# Patient Record
Sex: MALE | Race: WHITE | NOT HISPANIC OR LATINO | Employment: FULL TIME | URBAN - NONMETROPOLITAN AREA
[De-identification: names, ages, dates, MRNs, and addresses within clinical notes are randomized per-mention and may not be internally consistent; named-entity substitution may affect disease eponyms.]

---

## 2017-12-14 ENCOUNTER — TRANSCRIBE ORDERS (OUTPATIENT)
Dept: ADMINISTRATIVE | Facility: HOSPITAL | Age: 37
End: 2017-12-14

## 2017-12-14 ENCOUNTER — HOSPITAL ENCOUNTER (OUTPATIENT)
Dept: RESPIRATORY THERAPY | Facility: HOSPITAL | Age: 37
Discharge: HOME | End: 2017-12-14
Admitting: NURSE PRACTITIONER

## 2017-12-14 DIAGNOSIS — R00.2 PALPITATIONS: ICD-10-CM

## 2017-12-14 DIAGNOSIS — R00.2 PALPITATIONS: Primary | ICD-10-CM

## 2017-12-14 PROCEDURE — 93226 XTRNL ECG REC<48 HR SCAN A/R: CPT

## 2017-12-14 PROCEDURE — 93225 XTRNL ECG REC<48 HRS REC: CPT

## 2017-12-14 PROCEDURE — 93227 XTRNL ECG REC<48 HR R&I: CPT | Performed by: INTERNAL MEDICINE

## 2019-08-13 ENCOUNTER — OFFICE VISIT (OUTPATIENT)
Dept: UROLOGY | Facility: CLINIC | Age: 39
End: 2019-08-13

## 2019-08-13 VITALS — WEIGHT: 201.8 LBS | HEIGHT: 73 IN | BODY MASS INDEX: 26.74 KG/M2

## 2019-08-13 DIAGNOSIS — Z98.52 VASECTOMY STATUS: ICD-10-CM

## 2019-08-13 DIAGNOSIS — N41.1 PROSTATITIS, CHRONIC: Primary | ICD-10-CM

## 2019-08-13 PROCEDURE — 99204 OFFICE O/P NEW MOD 45 MIN: CPT | Performed by: UROLOGY

## 2019-08-13 RX ORDER — CELECOXIB 200 MG/1
200 CAPSULE ORAL
COMMUNITY
Start: 2015-01-28

## 2019-08-13 RX ORDER — DIAZEPAM 10 MG/1
TABLET ORAL
Qty: 2 TABLET | Refills: 0 | Status: SHIPPED | OUTPATIENT
Start: 2019-08-13 | End: 2021-02-19

## 2019-08-13 RX ORDER — SULFAMETHOXAZOLE AND TRIMETHOPRIM 800; 160 MG/1; MG/1
1 TABLET ORAL 2 TIMES DAILY
Qty: 84 TABLET | Refills: 0 | Status: SHIPPED | OUTPATIENT
Start: 2019-08-13 | End: 2021-02-19

## 2019-08-13 RX ORDER — CEPHALEXIN 500 MG/1
500 CAPSULE ORAL 2 TIMES DAILY
Qty: 8 CAPSULE | Refills: 0 | Status: SHIPPED | OUTPATIENT
Start: 2019-08-13 | End: 2019-08-17

## 2019-08-13 RX ORDER — TAMSULOSIN HYDROCHLORIDE 0.4 MG/1
1 CAPSULE ORAL NIGHTLY
Qty: 30 CAPSULE | Refills: 5 | Status: SHIPPED | OUTPATIENT
Start: 2019-08-13

## 2019-08-13 NOTE — PROGRESS NOTES
"Chief Complaint:          Chief Complaint   Patient presents with   • Prostatitis     New Patient        HPI:   39 y.o. male referred with a history of prostatitis.  In the past is mostly been characterized his pain.  Is been present greater than 5 years he has had 3 rounds of Septra treated by Dr. Dee.  He has chills.  Not much pain, good stream, hesitancy, it is a sensation of sitting on an egg.  His erection and ejaculations are okay he has no allergies no prior surgeries no medical problems and negative family history of prostate cancer PSA that was \"okay\".  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  He has 2 children with a 39-year-old wife.  He is interested in elective scrotal vasectomy as well.  Patient presents for consideration of an elective scrotal vasectomy.  I discussed the procedure at length.  I discussed the fact that it has a risk of anesthesia, bleeding, infection, testicular pain postoperatively, a failure in the range of 1 in 10,000.  The important necessity to have a follow-up in about 8 weeks after the original procedure to be sure that the semen specimen is free of spermatozoa thus ensuring sterility.  I discussed the various forms out there including a 1 incision, 2 incision technique as well as the after extensive discussion they wish to proceed      Past Medical History:      History reviewed. No pertinent past medical history.      Current Meds:     Current Outpatient Medications   Medication Sig Dispense Refill   • celecoxib (CELEBREX) 200 MG capsule Take 200 mg by mouth.       No current facility-administered medications for this visit.         Allergies:      No Known Allergies     Past Surgical History:     History reviewed. No pertinent surgical history.      Social History:     Social History     Socioeconomic History   • Marital status:      Spouse name: Not on file   • Number of children: Not on file   • Years of education: Not on file   • Highest education " level: Not on file       Family History:     History reviewed. No pertinent family history.    Review of Systems:     Review of Systems   Constitutional: Negative.    HENT: Negative.    Eyes: Negative.    Respiratory: Negative.    Cardiovascular: Negative.    Gastrointestinal: Negative.    Endocrine: Negative.    Genitourinary: Positive for difficulty urinating, frequency and testicular pain.   Musculoskeletal: Negative.    Allergic/Immunologic: Negative.    Neurological: Negative.    Hematological: Negative.    Psychiatric/Behavioral: Negative.        Physical Exam:     Physical Exam   Constitutional: He is oriented to person, place, and time. He appears well-developed and well-nourished.   HENT:   Head: Normocephalic and atraumatic.   Eyes: Conjunctivae and EOM are normal. Pupils are equal, round, and reactive to light.   Neck: Normal range of motion.   Cardiovascular: Normal rate, regular rhythm, normal heart sounds and intact distal pulses.   Pulmonary/Chest: Effort normal and breath sounds normal.   Abdominal: Soft. Bowel sounds are normal.   Genitourinary:   Genitourinary Comments: Soft nontender abdomen with no organomegaly, rigidity, or tenderness.  He has normal external genitalia and circumcised phallus n bilaterally descended testes without masses there is no inguinal hernias adenopathy or abnormalities he had good rectal tone and a large smooth firm prostate.  There is no nodularity or any suspicious rectal abnormalities     Musculoskeletal: Normal range of motion.   Neurological: He is alert and oriented to person, place, and time. He has normal reflexes.   Skin: Skin is warm and dry.   Psychiatric: He has a normal mood and affect. His behavior is normal. Judgment and thought content normal.   Nursing note and vitals reviewed.      I have reviewed the following portions of the patient's history: allergies, current medications, past family history, past medical history, past social history, past surgical  history, problem list and ROS and confirm it's accurate.      Procedure:       Assessment/Plan:   Prostatitis-we discussed the differential diagnosis of prostatitis including the National Institutes of Health classification system I through IV.  I discussed that type I prostatitis is  acute bacterial prostatitis and an associated with high fevers typically hematuria and severe pain with voiding.  We discussed the fact that it necessitates 6 weeks of chronic antibiotics to be sure it doesn't turn into a chronic bacterial prostatitis that can have lifelong ramifications.  We discussed National Institutes of Health type II chronic bacterial prostatitis.  We discussed the fact that this a chronic bacterial infection of the prostate that often requires suppressive antibiotics.  We discussed type III being related more to nonspecific urethritis as well as tight for being related to finding inflammation and a biopsy in the absence of symptomatology.  I discussed the diagnostic strategies including the VB 1 through 4 urine culture and discussed empiric therapy.  I emphasized that with the use of chronic antibiotics I strongly recommended the concomitant use of probiotics.  Additionally, we discussed the various antibiotics used and the risks and benefits associated with each particularly the use of long-term ciprofloxacin and the effect on joints and collagen in human beings.  I meant to use intermittent treatment with Septra.  We discussed the pathophysiology of acute bacterial prostatitis in the segue into chronic bacterial prostatitis which is what I believe he has.  Vasectomy status-interested in elective scrotal vasectomy  Testalgia-likely pressure voiding, vasectomy should help      .      Patient's Body mass index is 26.62 kg/m². BMI is above normal parameters. Recommendations include: educational material.              This document has been electronically signed by GAYLE GARCIA MD August 13, 2019 8:33 AM

## 2019-08-14 PROBLEM — Z98.52 VASECTOMY STATUS: Status: ACTIVE | Noted: 2019-08-14

## 2019-08-14 PROBLEM — N41.1 PROSTATITIS, CHRONIC: Status: ACTIVE | Noted: 2019-08-14

## 2019-09-20 ENCOUNTER — PRIOR AUTHORIZATION (OUTPATIENT)
Dept: UROLOGY | Facility: CLINIC | Age: 39
End: 2019-09-20

## 2020-10-07 ENCOUNTER — TRANSCRIBE ORDERS (OUTPATIENT)
Dept: ADMINISTRATIVE | Facility: HOSPITAL | Age: 40
End: 2020-10-07

## 2020-10-07 DIAGNOSIS — M54.2 CERVICALGIA: Primary | ICD-10-CM

## 2020-10-20 ENCOUNTER — HOSPITAL ENCOUNTER (OUTPATIENT)
Dept: MRI IMAGING | Facility: HOSPITAL | Age: 40
Discharge: HOME | End: 2020-10-20
Admitting: NURSE PRACTITIONER

## 2020-10-20 DIAGNOSIS — M54.2 CERVICALGIA: ICD-10-CM

## 2020-10-20 PROCEDURE — 72141 MRI NECK SPINE W/O DYE: CPT

## 2020-10-20 PROCEDURE — 72141 MRI NECK SPINE W/O DYE: CPT | Performed by: RADIOLOGY

## 2020-12-08 ENCOUNTER — OFFICE VISIT (OUTPATIENT)
Dept: NEUROSURGERY | Facility: CLINIC | Age: 40
End: 2020-12-08

## 2020-12-08 VITALS
WEIGHT: 224.2 LBS | DIASTOLIC BLOOD PRESSURE: 90 MMHG | HEIGHT: 73 IN | BODY MASS INDEX: 29.72 KG/M2 | SYSTOLIC BLOOD PRESSURE: 142 MMHG

## 2020-12-08 DIAGNOSIS — G56.03 BILATERAL CARPAL TUNNEL SYNDROME: Primary | ICD-10-CM

## 2020-12-08 DIAGNOSIS — M51.36 DEGENERATIVE DISC DISEASE, LUMBAR: ICD-10-CM

## 2020-12-08 PROCEDURE — 99203 OFFICE O/P NEW LOW 30 MIN: CPT | Performed by: NEUROLOGICAL SURGERY

## 2020-12-08 RX ORDER — GABAPENTIN 800 MG/1
800 TABLET ORAL 3 TIMES DAILY
COMMUNITY

## 2020-12-08 RX ORDER — IBUPROFEN 800 MG/1
800 TABLET ORAL EVERY 6 HOURS PRN
COMMUNITY

## 2020-12-08 RX ORDER — ACETAMINOPHEN AND CODEINE PHOSPHATE 60; 300 MG/1; MG/1
1 TABLET ORAL EVERY 4 HOURS PRN
COMMUNITY

## 2020-12-08 RX ORDER — CYCLOBENZAPRINE HCL 10 MG
10 TABLET ORAL 3 TIMES DAILY PRN
COMMUNITY

## 2020-12-08 NOTE — PROGRESS NOTES
"Amor Avila  1980  [id number]                        CHIEF COMPLAINT: Cervical and upper extremity pain; lumbar pain.         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  He has been told that he has a \"idiopathic neuropathy\".  He has been to physical therapy with very little improvement.  He has been taking NSAIDs which helped marginally.  He has been on gabapentin which is somewhat helpful.  In addition to pain in the cervical area she has pain in his low back which radiates into his lower extremities a bit more so on the right than the left.  He has no bowel or bladder dysfunction although he has chronic prostatitis.  Cervical MRI has been performed is referred for neurosurgical consultation.           Past Medical History:   Diagnosis Date   • Prostatitis, chronic 8/14/2019            History reviewed. No pertinent surgical history.           Family History   Problem Relation Age of Onset   • No Known Problems Father    • No Known Problems Mother               Social History     Socioeconomic History   • Marital status:      Spouse name: Not on file   • Number of children: Not on file   • Years of education: Not on file   • Highest education level: Not on file   Tobacco Use   • Smoking status: Never Smoker   • Smokeless tobacco: Current User   Substance and Sexual Activity   • Alcohol use: Yes     Frequency: Never   • Drug use: No            No Known Allergies           Current Outpatient Medications:   •  acetaminophen-codeine (TYLENOL #4) 300-60 MG per tablet, Take 1 tablet by mouth Every 4 (Four) Hours As Needed for Moderate Pain ., Disp: , Rfl:   •  cyclobenzaprine (FLEXERIL) 10 MG tablet, Take 10 mg by mouth 3 (Three) Times a Day As Needed for Muscle Spasms., Disp: , Rfl:   •  " "gabapentin (NEURONTIN) 800 MG tablet, Take 800 mg by mouth 3 (Three) Times a Day., Disp: , Rfl:   •  ibuprofen (ADVIL,MOTRIN) 800 MG tablet, Take 800 mg by mouth Every 6 (Six) Hours As Needed for Mild Pain ., Disp: , Rfl:   •  celecoxib (CELEBREX) 200 MG capsule, Take 200 mg by mouth., Disp: , Rfl:   •  diazePAM (VALIUM) 10 MG tablet, Use one tablet night before procedure at bedtime and morning of prior to procedure, Disp: 2 tablet, Rfl: 0  •  sulfamethoxazole-trimethoprim (BACTRIM DS) 800-160 MG per tablet, Take 1 tablet by mouth 2 (Two) Times a Day., Disp: 84 tablet, Rfl: 0  •  tamsulosin (FLOMAX) 0.4 MG capsule 24 hr capsule, Take 1 capsule by mouth Every Night., Disp: 30 capsule, Rfl: 5         Review of Systems   Constitutional: Positive for fatigue.   HENT: Positive for sinus pressure.    Musculoskeletal: Positive for arthralgias, back pain, neck pain and neck stiffness.   Allergic/Immunologic: Positive for environmental allergies and food allergies.   Neurological: Positive for dizziness, weakness, light-headedness, numbness and headaches. Negative for seizures.   Psychiatric/Behavioral: Positive for decreased concentration and sleep disturbance.               Vitals:    12/08/20 0955   BP: 142/90   BP Location: Right arm   Patient Position: Sitting   Cuff Size: Adult   Weight: 102 kg (224 lb 3.2 oz)   Height: 185.4 cm (73\")               EXAMINATION: BMI 29.5; weight 224.  He has full and active range of motion of the cervical and lumbar region.  I am unable to find evidence of weakness sensory loss or reflex asymmetry.  No Babinski Justus or clonus.  Gait is normal.            MEDICAL DECISION MAKING: Cervical MRI shows mild straightening and degenerative disc but no evidence of high-grade spinal or lateral recess stenosis.  No evidence of disc herniation or abnormal signal within the spinal cord.           ASSESSMENT/DISPOSITION: Although he has mild degenerative changes I do not find evidence of a large " disc herniation that would provide anatomical/clinical correlation.  I have ordered a lumbar MRI and the EMG and NCV of both upper extremities and will see him subsequently.  I shall keep you informed of his results of the studies and do appreciate seeing him.              I APPRECIATE THE OPPORTUNITY OF THIS REFERRAL. PLEASE CALL IF ANY       QUESTIONS [phone]

## 2020-12-15 ENCOUNTER — APPOINTMENT (OUTPATIENT)
Dept: MRI IMAGING | Facility: HOSPITAL | Age: 40
End: 2020-12-15

## 2020-12-16 ENCOUNTER — HOSPITAL ENCOUNTER (OUTPATIENT)
Dept: MRI IMAGING | Facility: HOSPITAL | Age: 40
Discharge: HOME | End: 2020-12-16
Admitting: NEUROLOGICAL SURGERY

## 2020-12-16 DIAGNOSIS — M51.36 DEGENERATIVE DISC DISEASE, LUMBAR: ICD-10-CM

## 2020-12-16 DIAGNOSIS — G56.03 BILATERAL CARPAL TUNNEL SYNDROME: ICD-10-CM

## 2020-12-16 PROCEDURE — 72148 MRI LUMBAR SPINE W/O DYE: CPT | Performed by: RADIOLOGY

## 2020-12-16 PROCEDURE — 72148 MRI LUMBAR SPINE W/O DYE: CPT

## 2021-02-12 ENCOUNTER — TELEPHONE (OUTPATIENT)
Dept: ORTHOPEDIC SURGERY | Facility: CLINIC | Age: 41
End: 2021-02-12

## 2021-02-19 ENCOUNTER — OFFICE VISIT (OUTPATIENT)
Dept: ORTHOPEDIC SURGERY | Facility: CLINIC | Age: 41
End: 2021-02-19

## 2021-02-19 VITALS
OXYGEN SATURATION: 98 % | WEIGHT: 210 LBS | DIASTOLIC BLOOD PRESSURE: 89 MMHG | SYSTOLIC BLOOD PRESSURE: 134 MMHG | RESPIRATION RATE: 17 BRPM | HEART RATE: 82 BPM | BODY MASS INDEX: 27.83 KG/M2 | HEIGHT: 73 IN | TEMPERATURE: 98 F

## 2021-02-19 DIAGNOSIS — M54.50 CHRONIC BILATERAL LOW BACK PAIN, UNSPECIFIED WHETHER SCIATICA PRESENT: Chronic | ICD-10-CM

## 2021-02-19 DIAGNOSIS — G89.29 CHRONIC NECK PAIN: Primary | Chronic | ICD-10-CM

## 2021-02-19 DIAGNOSIS — G89.29 CHRONIC BILATERAL LOW BACK PAIN, UNSPECIFIED WHETHER SCIATICA PRESENT: Chronic | ICD-10-CM

## 2021-02-19 DIAGNOSIS — M48.02 FORAMINAL STENOSIS OF CERVICAL REGION: Chronic | ICD-10-CM

## 2021-02-19 DIAGNOSIS — M50.30 DDD (DEGENERATIVE DISC DISEASE), CERVICAL: Chronic | ICD-10-CM

## 2021-02-19 DIAGNOSIS — M99.01 SEGMENTAL AND SOMATIC DYSFUNCTION OF CERVICAL REGION: ICD-10-CM

## 2021-02-19 DIAGNOSIS — M79.10 MUSCLE PAIN: ICD-10-CM

## 2021-02-19 DIAGNOSIS — M47.22 OSTEOARTHRITIS OF SPINE WITH RADICULOPATHY, CERVICAL REGION: Chronic | ICD-10-CM

## 2021-02-19 DIAGNOSIS — M62.838 MUSCLE SPASM: ICD-10-CM

## 2021-02-19 DIAGNOSIS — M47.896 OTHER OSTEOARTHRITIS OF SPINE, LUMBAR REGION: Chronic | ICD-10-CM

## 2021-02-19 DIAGNOSIS — M51.36 LUMBAR DEGENERATIVE DISC DISEASE: Chronic | ICD-10-CM

## 2021-02-19 DIAGNOSIS — M54.2 CHRONIC NECK PAIN: Primary | Chronic | ICD-10-CM

## 2021-02-19 PROBLEM — G56.01 CARPAL TUNNEL SYNDROME OF RIGHT WRIST: Chronic | Status: ACTIVE | Noted: 2021-02-19

## 2021-02-19 PROBLEM — M47.816 OSTEOARTHRITIS OF LUMBAR SPINE: Chronic | Status: ACTIVE | Noted: 2021-02-19

## 2021-02-19 PROBLEM — G56.22 CUBITAL TUNNEL SYNDROME ON LEFT: Chronic | Status: ACTIVE | Noted: 2021-02-19

## 2021-02-19 PROBLEM — M51.369 LUMBAR DEGENERATIVE DISC DISEASE: Chronic | Status: ACTIVE | Noted: 2021-02-19

## 2021-02-19 PROCEDURE — 98929 OSTEOPATH MANJ 9-10 REGIONS: CPT | Performed by: FAMILY MEDICINE

## 2021-02-19 PROCEDURE — 99203 OFFICE O/P NEW LOW 30 MIN: CPT | Performed by: FAMILY MEDICINE

## 2021-02-19 RX ORDER — FOLIC ACID 30; 200; 35; 20; 45; 55; 20; 3.4; 20; 120; 1000; 8; 200; 1500; 3; 25; 2.3 MG/1; MG/1; UG/1; MG/1; UG/1; UG/1; UG/1; MG/1; MG/1; MG/1; UG/1; UG/1; MG/1; UG/1; MG/1; MG/1; MG/1
TABLET ORAL
COMMUNITY
Start: 2021-02-16 | End: 2023-02-02 | Stop reason: ALTCHOICE

## 2021-02-19 NOTE — PROGRESS NOTES
Osteopathic Manipulative Treatment - OMT - Procedure Note      Patient: Amor Avila  YOB: 1980  Date of Encounter: 02/19/2021  Александр Jordan, DO     Subjective   Amor Avila is a 40 y.o. male who presents to the office today for Osteopathic Manipulative Treatment for Neck Pain (Neck and Back Pain)      HPI:   HPI patient was new to the practice but who had previously seen me at my old office, presents complaining of musculoskeletal neck and shoulder and low back pain.  He was previously getting OMT treatments regularly and would like to restart these.  Patient has had more swelling issues in the last year.  Has had MRIs of his neck and low back which showed bulging disks.  He sometimes has radiating pain into the legs and arms.  Has been recently diagnosed with right sided carpal tunnel syndrome and left-sided cubital tunnel syndrome.  Was getting OMT every 3 months from Dr. Rose in [place].  Is also being treated for suspected seronegative RA by Dr. Lubin.  Low back is not bothering him much today but the neck pain is. C/o lower neck pain radiating down into the shoulders.    Active Problem List:  Patient Active Problem List   Diagnosis   • Vasectomy status   • Prostatitis, chronic   • Lumbar degenerative disc disease   • DDD (degenerative disc disease), cervical   • Foraminal stenosis of cervical region   • Osteoarthritis of spine with radiculopathy, cervical region   • Osteoarthritis of lumbar spine   • Carpal tunnel syndrome of right wrist   • Cubital tunnel syndrome on left       Past Medical History:  Past Medical History:   Diagnosis Date   • Hypertension    • Prostatitis, chronic 8/14/2019   • Rheumatoid arthritis (CMS/HCC)        Past Surgical History:  Past Surgical History:   Procedure Laterality Date   • NO PAST SURGERIES         Family History:  Family History   Problem Relation Age of Onset   • Osteoarthritis Father    • Cancer Mother        Social History:  Social History  "    Socioeconomic History   • Marital status:      Spouse name: Not on file   • Number of children: Not on file   • Years of education: Not on file   • Highest education level: Not on file   Tobacco Use   • Smoking status: Former Smoker   • Smokeless tobacco: Current User   Substance and Sexual Activity   • Alcohol use: Yes     Frequency: Never     Comment: OCC   • Drug use: No   • Sexual activity: Defer       Allergies:  No Known Allergies    Review of Systems:   Review of Systems   Constitutional: Negative for activity change.   Respiratory: Negative for shortness of breath.    Musculoskeletal: Positive for arthralgias, myalgias and neck pain.   Neurological: Negative for weakness and numbness.       Physical Exam:   Visit Vitals  /89   Pulse 82   Temp 98 °F (36.7 °C)   Resp 17   Ht 185.4 cm (73\")   Wt 95.3 kg (210 lb)   SpO2 98%   BMI 27.71 kg/m²       Physical Exam  Vitals signs and nursing note reviewed.   Constitutional:       Appearance: Normal appearance.   Cardiovascular:      Rate and Rhythm: Normal rate.   Pulmonary:      Effort: Pulmonary effort is normal. No respiratory distress.   Skin:     General: Skin is warm and dry.   Neurological:      General: No focal deficit present.      Mental Status: He is alert and oriented to person, place, and time.      Sensory: No sensory deficit.      Motor: No weakness.      Deep Tendon Reflexes: Reflexes normal.       GENERAL: 40 y.o. male, alert and oriented X 3 in no acute distress.     Radiology Results:    Reviewed MRIs.  MRI C-spine 10/20/2020-  1.  C5-C6 disc desiccation and broad-based posterior disc bulging  causing mild left neural foraminal and lateral recess stenosis.  2.  C6-C7 broad-based posterior disc bulging causing left neural  foraminal and left lateral recess stenosis.    MRI L-spine -12/16/2020  Degenerative disc changes in the lower 2 lumbar disc spaces.  There is osteoarthritis in the facet joints that does mildly narrow " the  lateral recesses at L4-L5.    OMT Procedure  Indications: TART findings, muscle spasm, pain    Risks and benefits discussed and patient gave verbal consent to treat    Regions treated: Head, C-Spine, Ribs, Upper Extremity, T- Spine, L-Spine, Pelvis, Sacrum and Lower Extremity    Findings: Head Suboccipital restriction, Cervical Spine Generalized cervical motion restriction, AARL, C3 ERS R, C4 ERS R or C5 ERS L, Upper Extremity Right shoulder Restriction, Ribs 1-2 Right exhaled, Thoracic Spine Generalized Thoracic Restriction or T5 ERS R, Lumbar Spine Generalized Lumbar Restriction or L2 ERS L, Pelvis Right Posterior Innominate Rotation, Sacrum Right Sacral Restriction or Lower Extremity Right Psoas Restriction, Right Piriformis Restriction, Left Psoas Restriction or Left Piriformis Restriction    Modalities: HVLA, Muscle Energy and Direct Myofascial Release    Patient reports decreased pain, improved range of motion, and improved functionality after treatment. There were no adverse effects.    Assessment & Plan:   Assessment/Plan   Diagnoses and all orders for this visit:    1. Chronic neck pain (Primary)    2. Chronic bilateral low back pain, unspecified whether sciatica present    3. Muscle pain    4. Muscle spasm    5. Segmental and somatic dysfunction of cervical region    6. Lumbar degenerative disc disease    7. DDD (degenerative disc disease), cervical    8. Foraminal stenosis of cervical region    9. Osteoarthritis of spine with radiculopathy, cervical region    10. Other osteoarthritis of spine, lumbar region        MEDICATION ISSUES:  Discussed medication options and treatment plan of prescribed medication as well as the risks, benefits, and side effects including potential falls, possible impaired driving and metabolic adversities among others. Patient is agreeable to call the office with any worsening of symptoms or onset of side effects. Patient is agreeable to call 911 or go to the nearest ER  should he/she begin having SI/HI.     MEDS ORDERED DURING VISIT:  No orders of the defined types were placed in this encounter.    Patient did well with OMT today in the office.  Follow-up in 1 week for reevaluation and likely further treatment.  We will continue doing his physical therapy.  Continue to follow-up with his other specialist for his nerves and arthritis issues.           This document has been electronically signed by Jimenez Angel DO   February 19, 2021 12:01 EST    Part of this note may be an electronic transcription/translation of spoken language to printed text using the Dragon Dictation System.

## 2021-03-03 ENCOUNTER — OFFICE VISIT (OUTPATIENT)
Dept: ORTHOPEDIC SURGERY | Facility: CLINIC | Age: 41
End: 2021-03-03

## 2021-03-03 VITALS
HEIGHT: 73 IN | RESPIRATION RATE: 17 BRPM | TEMPERATURE: 97.7 F | WEIGHT: 210 LBS | BODY MASS INDEX: 27.83 KG/M2 | DIASTOLIC BLOOD PRESSURE: 79 MMHG | HEART RATE: 81 BPM | OXYGEN SATURATION: 98 % | SYSTOLIC BLOOD PRESSURE: 129 MMHG

## 2021-03-03 DIAGNOSIS — M62.838 MUSCLE SPASM: ICD-10-CM

## 2021-03-03 DIAGNOSIS — G89.29 CHRONIC NECK PAIN: Primary | ICD-10-CM

## 2021-03-03 DIAGNOSIS — M79.10 MUSCLE PAIN: ICD-10-CM

## 2021-03-03 DIAGNOSIS — M54.2 CHRONIC NECK PAIN: Primary | ICD-10-CM

## 2021-03-03 DIAGNOSIS — M54.50 CHRONIC BILATERAL LOW BACK PAIN, UNSPECIFIED WHETHER SCIATICA PRESENT: ICD-10-CM

## 2021-03-03 DIAGNOSIS — M99.07 SEGMENTAL AND SOMATIC DYSFUNCTION OF UPPER EXTREMITY: ICD-10-CM

## 2021-03-03 DIAGNOSIS — G89.29 CHRONIC BILATERAL LOW BACK PAIN, UNSPECIFIED WHETHER SCIATICA PRESENT: ICD-10-CM

## 2021-03-03 PROCEDURE — 99213 OFFICE O/P EST LOW 20 MIN: CPT | Performed by: FAMILY MEDICINE

## 2021-03-03 PROCEDURE — 98929 OSTEOPATH MANJ 9-10 REGIONS: CPT | Performed by: FAMILY MEDICINE

## 2021-03-03 NOTE — PROGRESS NOTES
Osteopathic Manipulative Treatment - OMT - Procedure Note      Patient: Amor Avila  YOB: 1980  Date of Encounter: 03/03/2021  No ref. provider found     Subjective   Amor Avila is a 40 y.o. male who presents to the office today for Osteopathic Manipulative Treatment for Pain of the Neck and Pain of the Lower Back      HPI:   HPI  Patient here for follow-up for chronic neck and back pain.  Did very well with OMT treatment at the last visit.  Feeling a lot better today with only mild soreness and spasm, worst in the left trapezius.  Would like another OMT treatment today.  Active Problem List:  Patient Active Problem List   Diagnosis   • Vasectomy status   • Prostatitis, chronic   • Lumbar degenerative disc disease   • DDD (degenerative disc disease), cervical   • Foraminal stenosis of cervical region   • Osteoarthritis of spine with radiculopathy, cervical region   • Osteoarthritis of lumbar spine   • Carpal tunnel syndrome of right wrist   • Cubital tunnel syndrome on left       Past Medical History:  Past Medical History:   Diagnosis Date   • Hypertension    • Prostatitis, chronic 8/14/2019   • Rheumatoid arthritis (CMS/[facility])        Past Surgical History:  Past Surgical History:   Procedure Laterality Date   • NO PAST SURGERIES         Family History:  Family History   Problem Relation Age of Onset   • Osteoarthritis Father    • Cancer Mother        Social History:  Social History     Socioeconomic History   • Marital status:      Spouse name: Not on file   • Number of children: Not on file   • Years of education: Not on file   • Highest education level: Not on file   Tobacco Use   • Smoking status: Former Smoker   • Smokeless tobacco: Current User   Substance and Sexual Activity   • Alcohol use: Yes     Frequency: Never     Comment: OCC   • Drug use: No   • Sexual activity: Defer       Allergies:  No Known Allergies    Review of Systems:   Review of Systems   Constitutional: Negative for  "activity change.   Musculoskeletal: Positive for myalgias and neck stiffness. Negative for arthralgias, back pain and neck pain.   Neurological: Negative for weakness and numbness.       Physical Exam:   Visit Vitals  /79   Pulse 81   Temp 97.7 °F (36.5 °C)   Resp 17   Ht 185.4 cm (73\")   Wt 95.3 kg (210 lb)   SpO2 98%   BMI 27.71 kg/m²       Physical Exam  Constitutional:       Appearance: Normal appearance.   Neck:      Musculoskeletal: Decreased range of motion. Muscular tenderness present. No pain with movement or spinous process tenderness.   Musculoskeletal:         General: Tenderness (L trapezius) present.      Left shoulder: He exhibits spasm.      Lumbar back: He exhibits decreased range of motion. He exhibits no tenderness.   Neurological:      General: No focal deficit present.      Mental Status: He is alert.      Sensory: No sensory deficit.      Motor: No weakness.       GENERAL: 40 y.o. male, alert and oriented X 3 in no acute distress.     Radiology Results:    No radiology results for the last 30 days.    OMT Procedure  Indications: TART findings, muscle spasm, pain    Risks and benefits discussed and patient gave verbal consent to treat    Regions treated: Head, C-Spine, Ribs, Upper Extremity, T- Spine, L-Spine, Pelvis, Sacrum and Lower Extremity    Findings: Head Suboccipital restriction, Cervical Spine Generalized cervical motion restriction, C3 ERS R, C4 FRS R or C6 ERS L, Upper Extremity Left shoulder restriction, Ribs 1-2 Left inhaled, Thoracic Spine Generalized Thoracic Restriction, T4 ERS R, T5 ERS R or T6 ERS R, Lumbar Spine Generalized Lumbar Restriction, L2 ERS L or L4 ERS R, Pelvis Right Posterior Innominate Rotation, Sacrum Right Sacral Restriction or Lower Extremity Right Piriformis Restriction or Left Piriformis Restriction    Modalities: HVLA, Muscle Energy, Counterstrain, Direct Myofascial Release and Indirect Myofascial Release    Patient reports decreased pain, improved " range of motion, and improved functionality after treatment. There were no adverse effects.    Assessment & Plan:   Assessment/Plan   Diagnoses and all orders for this visit:    1. Chronic neck pain (Primary)    2. Segmental and somatic dysfunction of upper extremity    3. Chronic bilateral low back pain, unspecified whether sciatica present    4. Muscle pain    5. Muscle spasm    Patient felt notably better after treatment, especially in the right trapezius area.  Follow-up in 1 week for reevaluation and likely further OMT.  We will treat him weekly for a few more treatments and then consider spacing it out if he is continuing to do well.         This document has been electronically signed by Jimenez Angel DO   March 3, 2021 14:46 EST    Part of this note may be an electronic transcription/translation of spoken language to printed text using the Dragon Dictation System.

## 2021-03-10 ENCOUNTER — OFFICE VISIT (OUTPATIENT)
Dept: ORTHOPEDIC SURGERY | Facility: CLINIC | Age: 41
End: 2021-03-10

## 2021-03-10 VITALS
HEIGHT: 73 IN | TEMPERATURE: 98.3 F | SYSTOLIC BLOOD PRESSURE: 137 MMHG | RESPIRATION RATE: 17 BRPM | BODY MASS INDEX: 27.83 KG/M2 | HEART RATE: 80 BPM | OXYGEN SATURATION: 98 % | DIASTOLIC BLOOD PRESSURE: 85 MMHG | WEIGHT: 210 LBS

## 2021-03-10 DIAGNOSIS — M54.50 CHRONIC BILATERAL LOW BACK PAIN, UNSPECIFIED WHETHER SCIATICA PRESENT: ICD-10-CM

## 2021-03-10 DIAGNOSIS — M99.01 SEGMENTAL AND SOMATIC DYSFUNCTION OF CERVICAL REGION: ICD-10-CM

## 2021-03-10 DIAGNOSIS — M79.10 MUSCLE PAIN: ICD-10-CM

## 2021-03-10 DIAGNOSIS — M62.838 MUSCLE SPASM: ICD-10-CM

## 2021-03-10 DIAGNOSIS — M54.2 CHRONIC NECK PAIN: Primary | ICD-10-CM

## 2021-03-10 DIAGNOSIS — G89.29 CHRONIC BILATERAL LOW BACK PAIN, UNSPECIFIED WHETHER SCIATICA PRESENT: ICD-10-CM

## 2021-03-10 DIAGNOSIS — G89.29 CHRONIC NECK PAIN: Primary | ICD-10-CM

## 2021-03-10 PROCEDURE — 99213 OFFICE O/P EST LOW 20 MIN: CPT | Performed by: FAMILY MEDICINE

## 2021-03-10 PROCEDURE — 98929 OSTEOPATH MANJ 9-10 REGIONS: CPT | Performed by: FAMILY MEDICINE

## 2021-03-10 RX ORDER — PANTOPRAZOLE SODIUM 40 MG/1
TABLET, DELAYED RELEASE ORAL
COMMUNITY
Start: 2021-03-05

## 2021-03-10 NOTE — PROGRESS NOTES
Patient: Amor Avila  YOB: 1980  Date of Encounter: 03/10/2021  No ref. provider found     Subjective   Amor Avila is a 41 y.o. male who presents to the office today for chronic neck and low back pain.  HPI:   HPI  Patient presents for follow-up for chronic neck and low back pain.  OMT has been helpful in managing this.  He has been flared up more this week because he played basketball with his son and overdid it.  Would like to be treated again with OMT.  Active Problem List:  Patient Active Problem List   Diagnosis   • Vasectomy status   • Prostatitis, chronic   • Lumbar degenerative disc disease   • DDD (degenerative disc disease), cervical   • Foraminal stenosis of cervical region   • Osteoarthritis of spine with radiculopathy, cervical region   • Osteoarthritis of lumbar spine   • Carpal tunnel syndrome of right wrist   • Cubital tunnel syndrome on left       Past Medical History:  Past Medical History:   Diagnosis Date   • Hypertension    • Prostatitis, chronic 8/14/2019   • Rheumatoid arthritis (CMS/[facility])        Past Surgical History:  Past Surgical History:   Procedure Laterality Date   • NO PAST SURGERIES         Family History:  Family History   Problem Relation Age of Onset   • Osteoarthritis Father    • Cancer Mother        Social History:  Social History     Socioeconomic History   • Marital status:      Spouse name: Not on file   • Number of children: Not on file   • Years of education: Not on file   • Highest education level: Not on file   Tobacco Use   • Smoking status: Former Smoker   • Smokeless tobacco: Current User   Substance and Sexual Activity   • Alcohol use: Yes     Comment: OCC   • Drug use: No   • Sexual activity: Defer       Allergies:  No Known Allergies    Review of Systems:   Review of Systems   Constitutional: Positive for activity change.   Musculoskeletal: Positive for arthralgias, back pain, myalgias, neck pain and neck stiffness.   Neurological: Negative  "for weakness and numbness.       Physical Exam:   Visit Vitals  /85   Pulse 80   Temp 98.3 °F (36.8 °C)   Resp 17   Ht 185.4 cm (73\")   Wt 95.3 kg (210 lb)   SpO2 98%   BMI 27.71 kg/m²       Physical Exam  Constitutional:       Appearance: Normal appearance.   Musculoskeletal:         General: Tenderness (BRENDON trapezius, lumbar paraspinals) present.      Cervical back: Muscular tenderness present. No pain with movement or spinous process tenderness. Decreased range of motion.      Lumbar back: No tenderness. Decreased range of motion.   Neurological:      General: No focal deficit present.      Mental Status: He is alert.      Sensory: No sensory deficit.      Motor: No weakness.       OMT Procedure  Indications: TART findings, muscle spasm, pain    Risks and benefits discussed and patient gave verbal consent to treat    Regions treated: Head, C-Spine, Ribs, Upper Extremity, T- Spine, L-Spine, Pelvis, Sacrum and Lower Extremity    Findings: Head Suboccipital restriction, Cervical Spine Generalized cervical motion restriction, AARL, C4 ERS L, C5 ERS L or C6 ERS L, Upper Extremity Left shoulder restriction, Ribs Thoracic Inlet Restriction, Thoracic Spine Generalized Thoracic Restriction, T3 ERS R, T4 ERS R, T5 ERS L or T6 ERS R, Lumbar Spine Generalized Lumbar Restriction, L1 ERS R or L4 ERS L, Pelvis Right Posterior Innominate Rotation, Sacrum Right Sacral Restriction or Lower Extremity Right Psoas Restriction, Right Piriformis Restriction, Left Psoas Restriction or Left Piriformis Restriction    Modalities: HVLA, Muscle Energy, Direct Myofascial Release and Indirect Myofascial Release    Patient reports decreased pain, improved range of motion, and improved functionality after treatment. There were no adverse effects.    Assessment & Plan:   Assessment/Plan   Diagnoses and all orders for this visit:    1. Chronic neck pain (Primary)    2. Chronic bilateral low back pain, unspecified whether sciatica " present    3. Muscle pain    4. Muscle spasm    5. Segmental and somatic dysfunction of cervical region        MEDICATION ISSUES:  Discussed medication options and treatment plan of prescribed medication as well as the risks, benefits, and side effects including potential falls, possible impaired driving and metabolic adversities among others. Patient is agreeable to call the office with any worsening of symptoms or onset of side effects. Patient is agreeable to call 911 or go to the nearest ER should he/she begin having SI/HI.     MEDS ORDERED DURING VISIT:  No orders of the defined types were placed in this encounter.  Patient did well with treatment today in the office.  Follow-up again in 1 week for reevaluation possible further treatment.  Discussed home stretching regimen for C-spine.  If continuing to do well after next treatment may spread treatments out more         This document has been electronically signed by Jimenez Angel DO   March 10, 2021 17:05 EST    Part of this note may be an electronic transcription/translation of spoken language to printed text using the Dragon Dictation System.

## 2021-03-18 ENCOUNTER — BULK ORDERING (OUTPATIENT)
Dept: CASE MANAGEMENT | Facility: OTHER | Age: 41
End: 2021-03-18

## 2021-03-18 DIAGNOSIS — Z23 IMMUNIZATION DUE: ICD-10-CM

## 2021-04-20 ENCOUNTER — TELEPHONE (OUTPATIENT)
Dept: ORTHOPEDIC SURGERY | Facility: CLINIC | Age: 41
End: 2021-04-20

## 2021-04-26 ENCOUNTER — OFFICE VISIT (OUTPATIENT)
Dept: ORTHOPEDIC SURGERY | Facility: CLINIC | Age: 41
End: 2021-04-26

## 2021-04-26 VITALS
DIASTOLIC BLOOD PRESSURE: 86 MMHG | BODY MASS INDEX: 27.83 KG/M2 | WEIGHT: 210 LBS | HEIGHT: 73 IN | HEART RATE: 90 BPM | SYSTOLIC BLOOD PRESSURE: 120 MMHG

## 2021-04-26 DIAGNOSIS — M62.838 MUSCLE SPASM: ICD-10-CM

## 2021-04-26 DIAGNOSIS — M99.01 SEGMENTAL AND SOMATIC DYSFUNCTION OF CERVICAL REGION: ICD-10-CM

## 2021-04-26 DIAGNOSIS — M79.10 MUSCLE PAIN: ICD-10-CM

## 2021-04-26 DIAGNOSIS — G89.29 CHRONIC BILATERAL LOW BACK PAIN, UNSPECIFIED WHETHER SCIATICA PRESENT: Primary | ICD-10-CM

## 2021-04-26 DIAGNOSIS — M54.2 CHRONIC NECK PAIN: ICD-10-CM

## 2021-04-26 DIAGNOSIS — M47.22 OSTEOARTHRITIS OF SPINE WITH RADICULOPATHY, CERVICAL REGION: ICD-10-CM

## 2021-04-26 DIAGNOSIS — M51.36 LUMBAR DEGENERATIVE DISC DISEASE: ICD-10-CM

## 2021-04-26 DIAGNOSIS — M54.50 CHRONIC BILATERAL LOW BACK PAIN, UNSPECIFIED WHETHER SCIATICA PRESENT: Primary | ICD-10-CM

## 2021-04-26 DIAGNOSIS — G89.29 CHRONIC NECK PAIN: ICD-10-CM

## 2021-04-26 PROCEDURE — 99213 OFFICE O/P EST LOW 20 MIN: CPT | Performed by: FAMILY MEDICINE

## 2021-04-26 PROCEDURE — 98929 OSTEOPATH MANJ 9-10 REGIONS: CPT | Performed by: FAMILY MEDICINE

## 2021-04-26 NOTE — PROGRESS NOTES
"Patient: Amor Avila  YOB: 1980  Date of Encounter: 04/26/2021  No ref. provider found     Subjective   Amor Avila is a 41 y.o. male who presents to the office today for Osteopathic Manipulative Treatment for Pain and Follow-up of the Cervical Spine and Pain and Follow-up of the Lumbar Spine      HPI:   HPI  Patient presents for follow-up for neck and low back pain.  Neck is a little tight today, but the low back is really bothering him.  Getting pain across mid low back without any lesion.  Reports that stretching his hamstrings help is helpful for this but he only gets to do it once or twice a day.  Reports that he has been getting some pain behind the left knee which he thinks is related.  No injury  Allergies:  No Known Allergies    Review of Systems:   Review of Systems   Constitutional: Positive for activity change.   Musculoskeletal: Positive for arthralgias, back pain, myalgias, neck pain and neck stiffness.   Neurological: Negative for weakness and numbness.       Physical Exam:   Visit Vitals  /86   Pulse 90   Ht 185.4 cm (73\")   Wt 95.3 kg (210 lb)   BMI 27.71 kg/m²       Physical Exam  Constitutional:       Appearance: Normal appearance.   Musculoskeletal:         General: Tenderness present. No swelling.      Cervical back: No spasms, tenderness or bony tenderness. Muscular tenderness present. No pain with movement or spinous process tenderness. Normal range of motion.      Lumbar back: Spasms and tenderness present. No bony tenderness. Decreased range of motion.      Left knee: No deformity, effusion or bony tenderness. Normal range of motion. Tenderness present. No LCL laxity or MCL laxity.Normal meniscus and normal patellar mobility.      Instability Tests: Anterior drawer test negative. Posterior drawer test negative.      Comments: Paraspinal tenderness    Left knee: Tender along posterior knee soft tissue/tendons.  Cannot recreate with resisted testing.   Neurological:     "  General: No focal deficit present.      Mental Status: He is alert.      Sensory: No sensory deficit.      Motor: No weakness.         OMT Procedure  Indications: TART findings, muscle spasm, pain    Risks and benefits discussed and patient gave verbal consent to treat    Regions treated: Head, C-Spine, Ribs, Upper Extremity, T- Spine, L-Spine, Pelvis, Sacrum and Lower Extremity    Findings: Head Suboccipital restriction, Cervical Spine Generalized cervical motion restriction, C4 FRS R or C5 ERS L, Upper Extremity Right shoulder Restriction, Ribs Thoracic Inlet Restriction, Thoracic Spine Generalized Thoracic Restriction, T6 ERS R or T7 ERS L, Lumbar Spine Generalized Lumbar Restriction or L4 ERS R, Pelvis Left Posterior Innominate Rotation, Sacrum Right Sacral Restriction or Lower Extremity Right Psoas Restriction or Left Psoas Restriction    Modalities: HVLA, Muscle Energy, Direct Myofascial Release and Indirect Myofascial Release    Patient reports decreased pain, improved range of motion, and improved functionality after treatment. There were no adverse effects.    Assessment & Plan:   Assessment/Plan   Diagnoses and all orders for this visit:    1. Chronic bilateral low back pain, unspecified whether sciatica present (Primary)    2. Chronic neck pain    3. Segmental and somatic dysfunction of cervical region    4. Muscle pain    5. Muscle spasm    6. Lumbar degenerative disc disease    7. Osteoarthritis of spine with radiculopathy, cervical region      Patient notably improved after treatment.  We will follow up as needed for further treatments.  Showed patient how to do standing hamstring stretches when she do them frequently at work.  If continues to have pain behind the knee will do an x-ray at the next visit.  Follow-up as needed             This document has been electronically signed by Jimenez Angel DO   April 26, 2021 10:46 EDT    Part of this note may be an electronic transcription/translation of  spoken language to printed text using the Dragon Dictation System.

## 2021-06-17 ENCOUNTER — OFFICE VISIT (OUTPATIENT)
Dept: ORTHOPEDIC SURGERY | Facility: CLINIC | Age: 41
End: 2021-06-17

## 2021-06-17 VITALS
HEART RATE: 78 BPM | BODY MASS INDEX: 27.83 KG/M2 | HEIGHT: 73 IN | WEIGHT: 210 LBS | SYSTOLIC BLOOD PRESSURE: 122 MMHG | DIASTOLIC BLOOD PRESSURE: 79 MMHG

## 2021-06-17 DIAGNOSIS — M99.01 SEGMENTAL AND SOMATIC DYSFUNCTION OF CERVICAL REGION: ICD-10-CM

## 2021-06-17 DIAGNOSIS — M54.50 CHRONIC BILATERAL LOW BACK PAIN, UNSPECIFIED WHETHER SCIATICA PRESENT: ICD-10-CM

## 2021-06-17 DIAGNOSIS — M54.2 CHRONIC NECK PAIN: Primary | ICD-10-CM

## 2021-06-17 DIAGNOSIS — G89.29 CHRONIC NECK PAIN: Primary | ICD-10-CM

## 2021-06-17 DIAGNOSIS — G89.29 CHRONIC BILATERAL LOW BACK PAIN, UNSPECIFIED WHETHER SCIATICA PRESENT: ICD-10-CM

## 2021-06-17 DIAGNOSIS — M79.10 MUSCLE PAIN: ICD-10-CM

## 2021-06-17 DIAGNOSIS — S76.312A STRAIN OF LEFT HAMSTRING MUSCLE, INITIAL ENCOUNTER: ICD-10-CM

## 2021-06-17 DIAGNOSIS — M62.838 MUSCLE SPASM: ICD-10-CM

## 2021-06-17 PROBLEM — Z72.0 TOBACCO USE: Status: ACTIVE | Noted: 2021-06-17

## 2021-06-17 PROCEDURE — 98929 OSTEOPATH MANJ 9-10 REGIONS: CPT | Performed by: FAMILY MEDICINE

## 2021-06-17 NOTE — PROGRESS NOTES
"Follow Up Visit      Patient: Amor Avila  YOB: 1980  Date of Encounter: 06/17/2021  PCP: Tish Rosado APRN    Subjective   Amor Avila is a 41 y.o. male who presents to the office today as a follow up for Pain, Follow-up, and stiffness of the Cervical Spine and Pain, Follow-up, and stiffness of the Lumbar Spine      Chief Complaint:   Chief Complaint   Patient presents with   • Cervical Spine - Pain, Follow-up, stiffness   • Lumbar Spine - Pain, Follow-up, stiffness       HPI:   HPI  Patient presents for follow-up for OMT.  Having soreness in the low back but neck is doing better today.  Would like another treatment.  Allergies:  No Known Allergies    Review of Systems:   Review of Systems   Constitutional: Negative for activity change and fever.   Respiratory: Negative for shortness of breath and wheezing.    Cardiovascular: Negative for chest pain.   Musculoskeletal: Positive for arthralgias, back pain, myalgias and neck stiffness. Negative for neck pain.   Skin: Negative for color change and wound.   Neurological: Negative for weakness and numbness.       Visit Vitals  /79   Pulse 78   Ht 185.4 cm (73\")   Wt 95.3 kg (210 lb)   BMI 27.71 kg/m²       Physical Exam:   Physical Exam  Musculoskeletal:      Cervical back: No spasms or tenderness. Decreased range of motion.      Lumbar back: Spasms and tenderness present. No bony tenderness. Decreased range of motion.       Osteopathic Manipulative Treatment - OMT - Procedure Note    OMT Procedure  Indications: TART findings, muscle spasm, pain    Risks and benefits discussed and patient gave verbal consent to treat    Regions treated: Head, C-Spine, Ribs, Upper Extremity, T- Spine, L-Spine, Pelvis, Sacrum and Lower Extremity    Findings: Head Suboccipital restriction, Cervical Spine Generalized cervical motion restriction, AARR or C4 ERS R, Upper Extremity Left shoulder restriction, Ribs 1-2 Left inhaled or Right exhaled, Thoracic Spine " Generalized Thoracic Restriction, Lumbar Spine Generalized Lumbar Restriction or L1 ERS L, Pelvis Right Posterior Innominate Rotation, Sacrum Right Sacral Restriction or Lower Extremity Right Psoas Restriction or Left Psoas Restriction    Modalities: HVLA, Muscle Energy, Direct Myofascial Release and Indirect Myofascial Release    Patient reports decreased pain, improved range of motion, and improved functionality after treatment. There were no adverse effects.          Assessment & Plan:   Assessment/Plan   Diagnoses and all orders for this visit:    1. Chronic neck pain (Primary)    2. Chronic bilateral low back pain, unspecified whether sciatica present    3. Muscle spasm    4. Muscle pain    5. Segmental and somatic dysfunction of cervical region    6. Strain of left hamstring muscle, initial encounter        Visit Diagnoses:    ICD-10-CM ICD-9-CM   1. Chronic neck pain  M54.2 723.1    G89.29 338.29   2. Chronic bilateral low back pain, unspecified whether sciatica present  M54.5 724.2    G89.29 338.29   3. Muscle spasm  M62.838 728.85   4. Muscle pain  M79.10 729.1   5. Segmental and somatic dysfunction of cervical region  M99.01 739.1   6. Strain of left hamstring muscle, initial encounter  S76.312A 843.8       MEDICATION ISSUES:  Discussed medication options and treatment plan of prescribed medication as well as the risks, benefits, and side effects including potential falls, possible impaired driving and metabolic adversities among others. Patient is agreeable to call the office with any worsening of symptoms or onset of side effects. Patient is agreeable to call 911 or go to the nearest ER should he/she begin having SI/HI.     MEDS ORDERED DURING VISIT:  No orders of the defined types were placed in this encounter.    Notably improved after treatment.  Follow-up as needed for further OMT         This document has been electronically signed by Jimenez Angel DO   June 17, 2021 13:17 EDT    Part of this note  may be an electronic transcription/translation of spoken language to printed text using the Dragon Dictation System.

## 2021-07-20 ENCOUNTER — OFFICE VISIT (OUTPATIENT)
Dept: ORTHOPEDIC SURGERY | Facility: CLINIC | Age: 41
End: 2021-07-20

## 2021-07-20 VITALS
DIASTOLIC BLOOD PRESSURE: 86 MMHG | WEIGHT: 210 LBS | BODY MASS INDEX: 27.83 KG/M2 | HEART RATE: 67 BPM | SYSTOLIC BLOOD PRESSURE: 124 MMHG | HEIGHT: 73 IN

## 2021-07-20 DIAGNOSIS — M79.10 MUSCLE PAIN: ICD-10-CM

## 2021-07-20 DIAGNOSIS — G89.29 CHRONIC NECK PAIN: Primary | ICD-10-CM

## 2021-07-20 DIAGNOSIS — M62.838 MUSCLE SPASM: ICD-10-CM

## 2021-07-20 DIAGNOSIS — M99.01 SEGMENTAL AND SOMATIC DYSFUNCTION OF CERVICAL REGION: ICD-10-CM

## 2021-07-20 DIAGNOSIS — M54.2 CHRONIC NECK PAIN: Primary | ICD-10-CM

## 2021-07-20 PROCEDURE — 98929 OSTEOPATH MANJ 9-10 REGIONS: CPT | Performed by: FAMILY MEDICINE

## 2021-07-26 NOTE — PROGRESS NOTES
"Follow Up Visit      Patient: Amor Avila  YOB: 1980  Date of Encounter: 07/20/2021  No ref. provider found     Subjective   Amor Avila is a 41 y.o. male who presents to the office today for Osteopathic Manipulative Treatment for Pain and Follow-up of the Cervical Spine; Pain, stiffness, and Follow-up of the Thoracic Spine; and OMT      HPI:   HPI  Patient presents complaining of neck and back spasms related to his work.  Requests OMT treatment.  Allergies:  No Known Allergies    Visit Vitals  /86   Pulse 67   Ht 185.4 cm (73\")   Wt 95.3 kg (210 lb)   BMI 27.71 kg/m²     OMT Procedure  Indications: TART findings, muscle spasm, pain    Risks and benefits discussed and patient gave verbal consent to treat    Regions treated: Head, C-Spine, Ribs, Upper Extremity, T- Spine, L-Spine, Pelvis, Sacrum and Lower Extremity    Findings: Head Suboccipital restriction, Cervical Spine Generalized cervical motion restriction, AARR, C3 ERS L or C4 ERS L, Upper Extremity Right shoulder Restriction, Ribs Thoracic Inlet Restriction, Thoracic Spine Generalized Thoracic Restriction, T6 ERS R, T7 ERS R or T8 ERS L, Lumbar Spine Generalized Lumbar Restriction, Pelvis Left Posterior Innominate Rotation, Sacrum Left Sacral Restriction or Lower Extremity Left Psoas Restriction    Modalities: HVLA, Muscle Energy, Direct Myofascial Release and Indirect Myofascial Release    Patient reports decreased pain, improved range of motion, and improved functionality after treatment. There were no adverse effects.    Assessment & Plan:   Assessment/Plan   Diagnoses and all orders for this visit:    1. Chronic neck pain (Primary)    2. Muscle spasm    3. Muscle pain    4. Segmental and somatic dysfunction of cervical region        MEDICATION ISSUES:  Discussed medication options and treatment plan of prescribed medication as well as the risks, benefits, and side effects including potential falls, possible impaired driving and " metabolic adversities among others. Patient is agreeable to call the office with any worsening of symptoms or onset of side effects. Patient is agreeable to call 911 or go to the nearest ER should he/she begin having SI/HI.     MEDS ORDERED DURING VISIT:  No orders of the defined types were placed in this encounter.             This document has been electronically signed by Jimenez Angel DO   July 26, 2021 09:29 EDT    Part of this note may be an electronic transcription/translation of spoken language to printed text using the Dragon Dictation System.

## 2021-09-02 ENCOUNTER — LAB (OUTPATIENT)
Dept: LAB | Facility: HOSPITAL | Age: 41
End: 2021-09-02

## 2021-09-02 ENCOUNTER — TRANSCRIBE ORDERS (OUTPATIENT)
Dept: ADMINISTRATIVE | Facility: HOSPITAL | Age: 41
End: 2021-09-02

## 2021-09-02 DIAGNOSIS — R53.83 FATIGUE, UNSPECIFIED TYPE: ICD-10-CM

## 2021-09-02 DIAGNOSIS — M06.09 RHEUMATOID ARTHRITIS OF MULTIPLE SITES WITHOUT RHEUMATOID FACTOR (HCC): ICD-10-CM

## 2021-09-02 DIAGNOSIS — R76.8 LOW SERUM IGA FOR AGE: ICD-10-CM

## 2021-09-02 DIAGNOSIS — M25.50 DIFFUSE ARTHRALGIA: ICD-10-CM

## 2021-09-02 DIAGNOSIS — M25.512 BILATERAL SHOULDER PAIN, UNSPECIFIED CHRONICITY: ICD-10-CM

## 2021-09-02 DIAGNOSIS — Z79.899 HIGH RISK MEDICATION USE: ICD-10-CM

## 2021-09-02 DIAGNOSIS — E78.5 HYPERLIPIDEMIA, UNSPECIFIED HYPERLIPIDEMIA TYPE: ICD-10-CM

## 2021-09-02 DIAGNOSIS — M25.511 BILATERAL SHOULDER PAIN, UNSPECIFIED CHRONICITY: ICD-10-CM

## 2021-09-02 DIAGNOSIS — R53.83 FATIGUE, UNSPECIFIED TYPE: Primary | ICD-10-CM

## 2021-09-02 LAB
ALBUMIN SERPL-MCNC: 4.2 G/DL (ref 3.5–5.2)
ALBUMIN/GLOB SERPL: 2.6 G/DL
ALP SERPL-CCNC: 64 U/L (ref 39–117)
ALT SERPL W P-5'-P-CCNC: 19 U/L (ref 1–41)
ANION GAP SERPL CALCULATED.3IONS-SCNC: 10.6 MMOL/L (ref 5–15)
AST SERPL-CCNC: 24 U/L (ref 1–40)
BASOPHILS # BLD AUTO: 0.04 10*3/MM3 (ref 0–0.2)
BASOPHILS NFR BLD AUTO: 0.6 % (ref 0–1.5)
BILIRUB SERPL-MCNC: 0.4 MG/DL (ref 0–1.2)
BUN SERPL-MCNC: 10 MG/DL (ref 6–20)
BUN/CREAT SERPL: 7.4 (ref 7–25)
CALCIUM SPEC-SCNC: 9.4 MG/DL (ref 8.6–10.5)
CHLORIDE SERPL-SCNC: 103 MMOL/L (ref 98–107)
CHOLEST SERPL-MCNC: 215 MG/DL (ref 0–200)
CO2 SERPL-SCNC: 26.4 MMOL/L (ref 22–29)
CORTIS AM PEAK SERPL-MCNC: 6.27 MCG/DL
CREAT SERPL-MCNC: 1.35 MG/DL (ref 0.76–1.27)
CRP SERPL-MCNC: <0.3 MG/DL (ref 0–0.5)
DEPRECATED RDW RBC AUTO: 47.8 FL (ref 37–54)
EOSINOPHIL # BLD AUTO: 0.02 10*3/MM3 (ref 0–0.4)
EOSINOPHIL NFR BLD AUTO: 0.3 % (ref 0.3–6.2)
ERYTHROCYTE [DISTWIDTH] IN BLOOD BY AUTOMATED COUNT: 14 % (ref 12.3–15.4)
ERYTHROCYTE [SEDIMENTATION RATE] IN BLOOD: 1 MM/HR (ref 0–15)
GFR SERPL CREATININE-BSD FRML MDRD: 58 ML/MIN/1.73
GLOBULIN UR ELPH-MCNC: 1.6 GM/DL
GLUCOSE SERPL-MCNC: 87 MG/DL (ref 65–99)
HCT VFR BLD AUTO: 40.1 % (ref 37.5–51)
HDLC SERPL-MCNC: 50 MG/DL (ref 40–60)
HGB BLD-MCNC: 14.1 G/DL (ref 13–17.7)
IGA1 MFR SER: 64 MG/DL (ref 70–400)
IGG1 SER-MCNC: 653 MG/DL (ref 700–1600)
IGM SERPL-MCNC: 84 MG/DL (ref 40–230)
IMM GRANULOCYTES # BLD AUTO: 0.02 10*3/MM3 (ref 0–0.05)
IMM GRANULOCYTES NFR BLD AUTO: 0.3 % (ref 0–0.5)
LDLC SERPL CALC-MCNC: 140 MG/DL (ref 0–100)
LDLC/HDLC SERPL: 2.74 {RATIO}
LYMPHOCYTES # BLD AUTO: 2.44 10*3/MM3 (ref 0.7–3.1)
LYMPHOCYTES NFR BLD AUTO: 38.8 % (ref 19.6–45.3)
MCH RBC QN AUTO: 33.1 PG (ref 26.6–33)
MCHC RBC AUTO-ENTMCNC: 35.2 G/DL (ref 31.5–35.7)
MCV RBC AUTO: 94.1 FL (ref 79–97)
MONOCYTES # BLD AUTO: 0.74 10*3/MM3 (ref 0.1–0.9)
MONOCYTES NFR BLD AUTO: 11.8 % (ref 5–12)
NEUTROPHILS NFR BLD AUTO: 3.03 10*3/MM3 (ref 1.7–7)
NEUTROPHILS NFR BLD AUTO: 48.2 % (ref 42.7–76)
NRBC BLD AUTO-RTO: 0 /100 WBC (ref 0–0.2)
PLATELET # BLD AUTO: 350 10*3/MM3 (ref 140–450)
PMV BLD AUTO: 9.5 FL (ref 6–12)
POTASSIUM SERPL-SCNC: 4.4 MMOL/L (ref 3.5–5.2)
PROT SERPL-MCNC: 5.8 G/DL (ref 6–8.5)
RBC # BLD AUTO: 4.26 10*6/MM3 (ref 4.14–5.8)
SODIUM SERPL-SCNC: 140 MMOL/L (ref 136–145)
TRIGL SERPL-MCNC: 141 MG/DL (ref 0–150)
VLDLC SERPL-MCNC: 25 MG/DL (ref 5–40)
WBC # BLD AUTO: 6.29 10*3/MM3 (ref 3.4–10.8)

## 2021-09-02 PROCEDURE — 82784 ASSAY IGA/IGD/IGG/IGM EACH: CPT

## 2021-09-02 PROCEDURE — 80061 LIPID PANEL: CPT

## 2021-09-02 PROCEDURE — 85025 COMPLETE CBC W/AUTO DIFF WBC: CPT

## 2021-09-02 PROCEDURE — 82533 TOTAL CORTISOL: CPT

## 2021-09-02 PROCEDURE — 84165 PROTEIN E-PHORESIS SERUM: CPT

## 2021-09-02 PROCEDURE — 80053 COMPREHEN METABOLIC PANEL: CPT

## 2021-09-02 PROCEDURE — 81490 AUTOIMMUNE RA ALYS 12 BMRK: CPT

## 2021-09-02 PROCEDURE — 86140 C-REACTIVE PROTEIN: CPT

## 2021-09-02 PROCEDURE — 85652 RBC SED RATE AUTOMATED: CPT

## 2021-09-02 PROCEDURE — 36415 COLL VENOUS BLD VENIPUNCTURE: CPT

## 2021-09-03 LAB
ALBUMIN SERPL ELPH-MCNC: 3.8 G/DL (ref 2.9–4.4)
ALBUMIN/GLOB SERPL: 1.5 {RATIO} (ref 0.7–1.7)
ALPHA1 GLOB SERPL ELPH-MCNC: 0.2 G/DL (ref 0–0.4)
ALPHA2 GLOB SERPL ELPH-MCNC: 0.7 G/DL (ref 0.4–1)
B-GLOBULIN SERPL ELPH-MCNC: 0.9 G/DL (ref 0.7–1.3)
GAMMA GLOB SERPL ELPH-MCNC: 0.7 G/DL (ref 0.4–1.8)
GLOBULIN SER CALC-MCNC: 2.5 G/DL (ref 2.2–3.9)
LABORATORY COMMENT REPORT: NORMAL
M PROTEIN SERPL ELPH-MCNC: NORMAL G/DL
PROT SERPL-MCNC: 6.3 G/DL (ref 6–8.5)
SPECIMEN STATUS: NORMAL

## 2021-09-10 LAB
CRP RESULT: 0.08 MG/L
EGF RESULT: 46 PG/ML
FOOTNOTE / HISTORY: NORMAL
IL-6 RESULT: 7.1 PG/ML
LEPTIN RESULT: 8.4 NG/ML
Lab: NORMAL
Lab: NORMAL
MMP-1 RESULT: 10 NG/ML
MMP-3 RESULT: 36 NG/ML
RESISTIN RESULT: 4.8 NG/ML
RISK OF RADIOGRAPHIC PROGRESS: 2 %
SAA RESULT: 0.07 UG/ML
TNF-RI RESULT: 1.1 NG/ML
VCAM-1 RESULT: 0.35 UG/ML
VECTRA(R) DA LEVEL: NORMAL
VECTRA(R) DA SCORE: 22
VEGF-A RESULT: 500 PG/ML
YKL-40 RESULT: 32 NG/ML

## 2023-02-02 ENCOUNTER — OFFICE VISIT (OUTPATIENT)
Dept: UROLOGY | Facility: CLINIC | Age: 43
End: 2023-02-02
Payer: COMMERCIAL

## 2023-02-02 VITALS — BODY MASS INDEX: 23.86 KG/M2 | HEIGHT: 73 IN | WEIGHT: 180 LBS

## 2023-02-02 DIAGNOSIS — N41.1 PROSTATITIS, CHRONIC: Primary | ICD-10-CM

## 2023-02-02 LAB
BILIRUB BLD-MCNC: NEGATIVE MG/DL
CLARITY, POC: CLEAR
COLOR UR: YELLOW
EXPIRATION DATE: NORMAL
GLUCOSE UR STRIP-MCNC: NEGATIVE MG/DL
KETONES UR QL: NEGATIVE
LEUKOCYTE EST, POC: NEGATIVE
Lab: NORMAL
NITRITE UR-MCNC: NEGATIVE MG/ML
PH UR: 5.5 [PH] (ref 5–8)
PROT UR STRIP-MCNC: NEGATIVE MG/DL
RBC # UR STRIP: NEGATIVE /UL
SP GR UR: 1.01 (ref 1–1.03)
UROBILINOGEN UR QL: NORMAL

## 2023-02-02 PROCEDURE — 81003 URINALYSIS AUTO W/O SCOPE: CPT

## 2023-02-02 PROCEDURE — 87086 URINE CULTURE/COLONY COUNT: CPT

## 2023-02-02 PROCEDURE — 84153 ASSAY OF PSA TOTAL: CPT

## 2023-02-02 PROCEDURE — 99203 OFFICE O/P NEW LOW 30 MIN: CPT

## 2023-02-02 RX ORDER — SULFAMETHOXAZOLE AND TRIMETHOPRIM 800; 160 MG/1; MG/1
TABLET ORAL
COMMUNITY
Start: 2023-01-10 | End: 2023-02-17 | Stop reason: ALTCHOICE

## 2023-02-02 RX ORDER — FINASTERIDE 5 MG/1
5 TABLET, FILM COATED ORAL DAILY
Qty: 30 TABLET | Refills: 5 | Status: SHIPPED | OUTPATIENT
Start: 2023-02-02

## 2023-02-02 RX ORDER — TADALAFIL 5 MG/1
5 TABLET ORAL DAILY PRN
Qty: 30 TABLET | Refills: 6 | Status: SHIPPED | OUTPATIENT
Start: 2023-02-02

## 2023-02-02 RX ORDER — CIPROFLOXACIN 500 MG/1
500 TABLET, FILM COATED ORAL 2 TIMES DAILY
Qty: 20 TABLET | Refills: 0 | Status: SHIPPED | OUTPATIENT
Start: 2023-02-02 | End: 2023-02-17 | Stop reason: ALTCHOICE

## 2023-02-02 NOTE — PROGRESS NOTES
"Chief Complaint:    Chief Complaint   Patient presents with   • PROSTATITIS        Vital Signs:   Ht 185.4 cm (73\")   Wt 81.6 kg (180 lb)   BMI 23.75 kg/m²   Body mass index is 23.75 kg/m².      HPI:  Amor Avila is a 42 y.o. male who presents today for initial evaluation     History of Present Illness  Mr. Castillo presents to the clinic for initial evaluation of prostatitis.  He reports he has had prostatitis roughly 2-3 times in the past.  He was previously seen by Dr. Cedeño in 2017 for chronic prostatitis where he was treated with a long course of Bactrim twice daily.  States that over the past 6 months he has had an increase in difficulty urinating.  He reports that within the past month he has started to notice frequency and urgency.  He states that he has to push or strain to begin urination.  Currently takes Flomax twice daily with minimal benefit.  He also has been placed on Bactrim twice daily by his primary care provider for the past month with no improvement of symptoms.  He is unsure when his last PSA was.  Denies any family history of prostate cancer, bladder cancer, breast/ovarian cancer, or other carcinomas.  States that he has had a prostate exam in the past that revealed a significantly enlarged prostate. Patient has not desires to have a digital rectal exam at this time.  I will start the patient on a short course of Cipro 500 mg twice daily for 10 days.  I will also obtain a PSA today.  I would like to start the patient on a trial of finasteride pending PSA results.  Patient's UA today shows no signs of infection.  I will send off his urine for culture.      Past Medical History:  Past Medical History:   Diagnosis Date   • Hypertension    • Prostatitis, chronic 8/14/2019   • Rheumatoid arthritis (CMS/[facility])        Current Meds:  Current Outpatient Medications   Medication Sig Dispense Refill   • acetaminophen-codeine (TYLENOL #4) 300-60 MG per tablet Take 1 tablet by mouth Every 4 (Four) Hours " As Needed for Moderate Pain .     • Asmanex, 60 Metered Doses, 220 MCG/INH inhaler      • celecoxib (CeleBREX) 200 MG capsule Take 200 mg by mouth.     • cyclobenzaprine (FLEXERIL) 10 MG tablet Take 10 mg by mouth 3 (Three) Times a Day As Needed for Muscle Spasms.     • gabapentin (NEURONTIN) 800 MG tablet Take 800 mg by mouth 3 (Three) Times a Day.     • ibuprofen (ADVIL,MOTRIN) 800 MG tablet Take 800 mg by mouth Every 6 (Six) Hours As Needed for Mild Pain .     • pantoprazole (PROTONIX) 40 MG EC tablet      • sulfamethoxazole-trimethoprim (BACTRIM DS,SEPTRA DS) 800-160 MG per tablet      • tamsulosin (FLOMAX) 0.4 MG capsule 24 hr capsule Take 1 capsule by mouth Every Night. 30 capsule 5   • Breo Ellipta 200-25 MCG/INH inhaler      • ciprofloxacin (Cipro) 500 MG tablet Take 1 tablet by mouth 2 (Two) Times a Day. 20 tablet 0   • finasteride (PROSCAR) 5 MG tablet Take 1 tablet by mouth Daily. 30 tablet 5   • Lactobacillus (Lactojen) capsule      • methotrexate 2.5 MG tablet      • Multiple Vitamins-Minerals (Vitranol) tablet      • tadalafil (Cialis) 5 MG tablet Take 1 tablet by mouth Daily As Needed for Erectile Dysfunction. Take one Daily 30 tablet 6     No current facility-administered medications for this visit.        Allergies:   No Known Allergies     Past Surgical History:  Past Surgical History:   Procedure Laterality Date   • NO PAST SURGERIES         Social History:  Social History     Socioeconomic History   • Marital status:    Tobacco Use   • Smoking status: Former   • Smokeless tobacco: Former   Vaping Use   • Vaping Use: Never used   Substance and Sexual Activity   • Alcohol use: Yes     Comment: OCC   • Drug use: No   • Sexual activity: Defer       Family History:  Family History   Problem Relation Age of Onset   • Osteoarthritis Father    • Cancer Mother        Review of Systems:  Review of Systems   Constitutional: Negative for chills, fatigue, fever and unexpected weight change.    Respiratory: Negative for cough, chest tightness, shortness of breath and wheezing.    Cardiovascular: Negative for chest pain, palpitations and leg swelling.   Gastrointestinal: Negative for abdominal pain, constipation, diarrhea, nausea and vomiting.   Genitourinary: Positive for difficulty urinating, frequency and urgency. Negative for dysuria, hematuria, penile swelling, scrotal swelling and testicular pain.   Musculoskeletal: Negative for back pain and joint swelling.   Skin: Negative for rash.   Neurological: Negative for dizziness and headaches.   Psychiatric/Behavioral: Negative for confusion and suicidal ideas.       Physical Exam:  Physical Exam  Constitutional:       General: He is not in acute distress.     Appearance: Normal appearance.   HENT:      Head: Normocephalic and atraumatic.      Nose: Nose normal.      Mouth/Throat:      Mouth: Mucous membranes are moist.   Eyes:      Conjunctiva/sclera: Conjunctivae normal.   Cardiovascular:      Rate and Rhythm: Normal rate and regular rhythm.      Pulses: Normal pulses.      Heart sounds: Normal heart sounds.   Pulmonary:      Effort: Pulmonary effort is normal.      Breath sounds: Normal breath sounds.   Abdominal:      General: Bowel sounds are normal.      Palpations: Abdomen is soft.      Tenderness: There is no right CVA tenderness or left CVA tenderness.   Genitourinary:     Comments: Did not wish to have a digital rectal exam at this time  Musculoskeletal:         General: Normal range of motion.      Cervical back: Normal range of motion.   Skin:     General: Skin is warm.   Neurological:      General: No focal deficit present.      Mental Status: He is alert and oriented to person, place, and time.   Psychiatric:         Mood and Affect: Mood normal.         Behavior: Behavior normal.         Thought Content: Thought content normal.         Judgment: Judgment normal.       Recent Image (CT and/or KUB):   CT Abdomen and Pelvis: No results found  for this or any previous visit.     CT Stone Protocol: No results found for this or any previous visit.     KUB: No results found for this or any previous visit.       Labs:  Brief Urine Lab Results  (Last result in the past 365 days)      Color   Clarity   Blood   Leuk Est   Nitrite   Protein   CREAT   Urine HCG        02/02/23 1437 Yellow   Clear   Negative   Negative   Negative   Negative               Office Visit on 02/02/2023   Component Date Value Ref Range Status   • Color 02/02/2023 Yellow  Yellow, Straw, Dark Yellow, Danielle Final   • Clarity, UA 02/02/2023 Clear  Clear Final   • Specific Gravity  02/02/2023 1.010  1.005 - 1.030 Final   • pH, Urine 02/02/2023 5.5  5.0 - 8.0 Final   • Leukocytes 02/02/2023 Negative  Negative Final   • Nitrite, UA 02/02/2023 Negative  Negative Final   • Protein, POC 02/02/2023 Negative  Negative mg/dL Final   • Glucose, UA 02/02/2023 Negative  Negative mg/dL Final   • Ketones, UA 02/02/2023 Negative  Negative Final   • Urobilinogen, UA 02/02/2023 Normal  Normal, 0.2 E.U./dL Final   • Bilirubin 02/02/2023 Negative  Negative Final   • Blood, UA 02/02/2023 Negative  Negative Final   • Lot Number 02/02/2023 [id number]   Final   • Expiration Date 02/02/2023 122,123   Final        Procedure: None  Procedures     I have reviewed and agree with the above PMH, PSH, FMH, social history, medications, allergies, and labs.     Assessment/Plan:   Problem List Items Addressed This Visit        Genitourinary and Reproductive     Prostatitis, chronic - Primary    Relevant Medications    tadalafil (Cialis) 5 MG tablet    finasteride (PROSCAR) 5 MG tablet    ciprofloxacin (Cipro) 500 MG tablet    Other Relevant Orders    PSA Diagnostic    POC Urinalysis Dipstick, Automated (Completed)    Urine Culture - Urine, Urine, Clean Catch       Health Maintenance:   Health Maintenance Due   Topic Date Due   • COVID-19 Vaccine (1) Never done   • TDAP/TD VACCINES (1 - Tdap) Never done   • HEPATITIS C  SCREENING  Never done   • ANNUAL PHYSICAL  Never done   • INFLUENZA VACCINE  Never done        Smoking Counseling: Patient is a former smoker and user of smokeless tobacco    Urine Incontinence: Patient reports that he is not currently experiencing any symptoms of urinary incontinence.    Patient was given instructions and counseling regarding his condition or for health maintenance advice. Please see specific information pulled into the AVS if appropriate.    Patient Education:   Chronic prostatitis - we discussed the differential diagnosis of prostatitis including the National Institutes of Health classification system I through IV.  I discussed that type I prostatitis is  acute bacterial prostatitis and an associated with high fevers typically hematuria and severe pain with voiding.  We discussed the fact that it necessitates 6 weeks of chronic antibiotics to be sure it doesn't turn into a chronic bacterial prostatitis that can have lifelong ramifications.  We discussed National Institutes of Health type II chronic bacterial prostatitis.  We discussed the fact that this a chronic bacterial infection of the prostate that often requires suppressive antibiotics.  We discussed type III being related more to nonspecific urethritis as well as tight for being related to finding inflammation and a biopsy in the absence of symptomatology.  I discussed the diagnostic strategies including the VB 1 through 4 urine culture and discussed empiric therapy.  I emphasized that with the use of chronic antibiotics I strongly recommended the concomitant use of probiotics.  Additionally, we discussed the various antibiotics used and the risks and benefits associated with each particularly the use of long-term ciprofloxacin and the effect on joints and collagen in human beings.  Since patient has been on Bactrim twice daily for 1 month with no significant improvement on recommending a short course of ciprofloxacin twice daily for 10  days.  I discussed with him the risks of this medication.  Also will have the patient start taking tadalafil 5 mg once daily to help with urinary symptoms.  Discussed the risk and side effects of this medication as well.  Advised patient to not take with Flomax.  I will also start the patient on a trial of finasteride once daily pending PSA results.  I would like to see him back in 2 weeks for reevaluation of symptoms.  Patient verbalizes understanding and agrees to plan of care.    Visit Diagnoses:    ICD-10-CM ICD-9-CM   1. Prostatitis, chronic  N41.1 601.1       Meds Ordered During Visit:  New Medications Ordered This Visit   Medications   • tadalafil (Cialis) 5 MG tablet     Sig: Take 1 tablet by mouth Daily As Needed for Erectile Dysfunction. Take one Daily     Dispense:  30 tablet     Refill:  6   • finasteride (PROSCAR) 5 MG tablet     Sig: Take 1 tablet by mouth Daily.     Dispense:  30 tablet     Refill:  5   • ciprofloxacin (Cipro) 500 MG tablet     Sig: Take 1 tablet by mouth 2 (Two) Times a Day.     Dispense:  20 tablet     Refill:  0       Follow Up Appointment: 2 weeks  No follow-ups on file.      This document has been electronically signed by Arron Flores PA-C   February 2, 2023 14:44 EST    Part of this note may be an electronic transcription/translation of spoken language to printed text using the Dragon Dictation System.

## 2023-02-03 LAB
BACTERIA SPEC AEROBE CULT: NO GROWTH
PSA SERPL-MCNC: 0.48 NG/ML (ref 0–4)

## 2023-02-17 ENCOUNTER — OFFICE VISIT (OUTPATIENT)
Dept: UROLOGY | Facility: CLINIC | Age: 43
End: 2023-02-17
Payer: COMMERCIAL

## 2023-02-17 VITALS — WEIGHT: 180 LBS | BODY MASS INDEX: 23.86 KG/M2 | HEIGHT: 73 IN

## 2023-02-17 DIAGNOSIS — Z98.52 VASECTOMY STATUS: ICD-10-CM

## 2023-02-17 DIAGNOSIS — N40.1 BENIGN PROSTATIC HYPERPLASIA WITH URINARY HESITANCY: ICD-10-CM

## 2023-02-17 DIAGNOSIS — R39.11 BENIGN PROSTATIC HYPERPLASIA WITH URINARY HESITANCY: ICD-10-CM

## 2023-02-17 DIAGNOSIS — N41.1 PROSTATITIS, CHRONIC: Primary | ICD-10-CM

## 2023-02-17 PROCEDURE — 99213 OFFICE O/P EST LOW 20 MIN: CPT

## 2023-02-17 RX ORDER — CEPHALEXIN 500 MG/1
500 CAPSULE ORAL 2 TIMES DAILY
Qty: 8 CAPSULE | Refills: 0 | Status: SHIPPED | OUTPATIENT
Start: 2023-02-17

## 2023-02-17 NOTE — PROGRESS NOTES
"Chief Complaint:    Chief Complaint   Patient presents with   • Prostatitis        Vital Signs:   Ht 185.4 cm (72.99\")   Wt 81.6 kg (180 lb)   BMI 23.75 kg/m²   Body mass index is 23.75 kg/m².      HPI:  Amor Avila is a 42 y.o. male who presents today for follow up    History of Present Illness  Mr. Avila presents to the clinic for 2-week follow-up for prostatitis.  Patient has been on Bactrim for 1 month by his primary care provider and I placed him on ciprofloxacin for 10 days.  He still complains of difficulty starting urination.  Patient states that he has to strain and patient to begin urination.  He reports feeling like he has to use his abdominal muscles to start his stream.  He denies any fever, chills, urethral discharge, perineum pain, pain with defecation, hematospermia, gross hematuria, frequency, urgency, penile pain, testicular pain, or split stream.  Given the patient's minimal improvement with antibiotic therapy at this time I am recommending a lower tract investigation with cystoscopy to rule out obstruction due to enlargement of the prostate or possible stricture.  He is also interested in undergoing an elective scrotal vasectomy.  He was previously scheduled by Dr. Cedeño 1 to 2 years ago however never kept his vasectomy appointment.  We will schedule him office soon as possible.      Past Medical History:  Past Medical History:   Diagnosis Date   • Hypertension    • Prostatitis, chronic 8/14/2019   • Rheumatoid arthritis (HCC)        Current Meds:  Current Outpatient Medications   Medication Sig Dispense Refill   • acetaminophen-codeine (TYLENOL #4) 300-60 MG per tablet Take 1 tablet by mouth Every 4 (Four) Hours As Needed for Moderate Pain .     • Asmanex, 60 Metered Doses, 220 MCG/INH inhaler      • celecoxib (CeleBREX) 200 MG capsule Take 200 mg by mouth.     • cyclobenzaprine (FLEXERIL) 10 MG tablet Take 10 mg by mouth 3 (Three) Times a Day As Needed for Muscle Spasms.     • " finasteride (PROSCAR) 5 MG tablet Take 1 tablet by mouth Daily. 30 tablet 5   • gabapentin (NEURONTIN) 800 MG tablet Take 800 mg by mouth 3 (Three) Times a Day.     • ibuprofen (ADVIL,MOTRIN) 800 MG tablet Take 800 mg by mouth Every 6 (Six) Hours As Needed for Mild Pain .     • pantoprazole (PROTONIX) 40 MG EC tablet      • tadalafil (Cialis) 5 MG tablet Take 1 tablet by mouth Daily As Needed for Erectile Dysfunction. Take one Daily 30 tablet 6   • tamsulosin (FLOMAX) 0.4 MG capsule 24 hr capsule Take 1 capsule by mouth Every Night. 30 capsule 5   • cephalexin (Keflex) 500 MG capsule Take 1 capsule by mouth 2 (Two) Times a Day. Start the day before procedure. 8 capsule 0     No current facility-administered medications for this visit.        Allergies:   No Known Allergies     Past Surgical History:  Past Surgical History:   Procedure Laterality Date   • NO PAST SURGERIES         Social History:  Social History     Socioeconomic History   • Marital status:    Tobacco Use   • Smoking status: Former   • Smokeless tobacco: Former   Vaping Use   • Vaping Use: Never used   Substance and Sexual Activity   • Alcohol use: Yes     Comment: OCC   • Drug use: No   • Sexual activity: Defer       Family History:  Family History   Problem Relation Age of Onset   • Osteoarthritis Father    • Cancer Mother        Review of Systems:  Review of Systems   Constitutional: Negative for chills, fatigue, fever and unexpected weight change.   Respiratory: Negative for cough, chest tightness, shortness of breath and wheezing.    Cardiovascular: Negative for chest pain and leg swelling.   Gastrointestinal: Negative for abdominal pain, constipation, diarrhea, nausea and vomiting.   Genitourinary: Positive for difficulty urinating. Negative for dysuria, flank pain, frequency, hematuria, penile pain, testicular pain and urgency.   Musculoskeletal: Negative for back pain and joint swelling.   Skin: Negative for rash.   Neurological:  Negative for dizziness and headaches.   Psychiatric/Behavioral: Negative for confusion and suicidal ideas.       Physical Exam:  Physical Exam  Constitutional:       General: He is not in acute distress.     Appearance: Normal appearance.   HENT:      Head: Normocephalic and atraumatic.      Nose: Nose normal.      Mouth/Throat:      Mouth: Mucous membranes are moist.   Eyes:      Conjunctiva/sclera: Conjunctivae normal.   Cardiovascular:      Rate and Rhythm: Normal rate and regular rhythm.      Pulses: Normal pulses.      Heart sounds: Normal heart sounds.   Pulmonary:      Effort: Pulmonary effort is normal.      Breath sounds: Normal breath sounds.   Abdominal:      General: Bowel sounds are normal.      Palpations: Abdomen is soft.   Genitourinary:     Penis: Normal.       Testes: Normal.      Comments: He does not wish to have a digital rectal exam at this time  Musculoskeletal:         General: Normal range of motion.      Cervical back: Normal range of motion.   Skin:     General: Skin is warm.   Neurological:      General: No focal deficit present.      Mental Status: He is alert and oriented to person, place, and time.   Psychiatric:         Mood and Affect: Mood normal.         Behavior: Behavior normal.         Thought Content: Thought content normal.         Judgment: Judgment normal.                         Recent Image (CT and/or KUB):   CT Abdomen and Pelvis: No results found for this or any previous visit.     CT Stone Protocol: No results found for this or any previous visit.     KUB: No results found for this or any previous visit.       Labs:  Brief Urine Lab Results  (Last result in the past 365 days)      Color   Clarity   Blood   Leuk Est   Nitrite   Protein   CREAT   Urine HCG        02/02/23 1437 Yellow   Clear   Negative   Negative   Negative   Negative               Office Visit on 02/02/2023   Component Date Value Ref Range Status   • PSA 02/02/2023 0.479  0.000 - 4.000 ng/mL Final   •  Color 02/02/2023 Yellow  Yellow, Straw, Dark Yellow, Danielle Final   • Clarity, UA 02/02/2023 Clear  Clear Final   • Specific Gravity  02/02/2023 1.010  1.005 - 1.030 Final   • pH, Urine 02/02/2023 5.5  5.0 - 8.0 Final   • Leukocytes 02/02/2023 Negative  Negative Final   • Nitrite, UA 02/02/2023 Negative  Negative Final   • Protein, POC 02/02/2023 Negative  Negative mg/dL Final   • Glucose, UA 02/02/2023 Negative  Negative mg/dL Final   • Ketones, UA 02/02/2023 Negative  Negative Final   • Urobilinogen, UA 02/02/2023 Normal  Normal, 0.2 E.U./dL Final   • Bilirubin 02/02/2023 Negative  Negative Final   • Blood, UA 02/02/2023 Negative  Negative Final   • Lot Number 02/02/2023 [id number]   Final   • Expiration Date 02/02/2023 122,123   Final   • Urine Culture 02/02/2023 No growth   Final        Procedure: None  Procedures     I have reviewed and agree with the above PMH, PSH, FMH, social history, medications, allergies, and labs.      Assessment/Plan:   Problem List Items Addressed This Visit        Genitourinary and Reproductive     Vasectomy status    Relevant Medications    cephalexin (Keflex) 500 MG capsule    Prostatitis, chronic - Primary   Other Visit Diagnoses     Benign prostatic hyperplasia with urinary hesitancy              Health Maintenance:   Health Maintenance Due   Topic Date Due   • COVID-19 Vaccine (1) Never done   • TDAP/TD VACCINES (1 - Tdap) Never done   • HEPATITIS C SCREENING  Never done   • ANNUAL PHYSICAL  Never done   • INFLUENZA VACCINE  Never done        Smoking Counseling: Former smoker and former user smokeless tobacco    Urine Incontinence: Patient reports that he is not currently experiencing any symptoms of urinary incontinence.    Patient was given instructions and counseling regarding his condition or for health maintenance advice. Please see specific information pulled into the AVS if appropriate.    Patient Education:   Chronic prostatitis -patient has been on several rounds of  antibiotics at this time with no significant improvement in symptoms.  He still reports a difficulty straining with urination and hesitancy.  At this time recommending a lower tract investigation with cystoscopy in office with Dr. Cedeño.  He does not wish to continue any other antibiotics at this time.  Benign Prostate Hypertrophy (BPH): Discussed the pathophysiology of BPH and obstruction.  We discussed the static and dynamic effects of BPH as well as using 5 alpha reductase inhibitors versus alpha blockade.  We discussed the indications for transurethral surgery as well and/ or other therapeutic options available including all of the newer techniques.  Patient is currently on Flomax and tadalafil 5 mg once daily with minimal benefit.  We will continue Percocet at this time.  I would also started him on finasteride roughly 2 weeks ago.  We will have him continue this daily as well.  I am recommending a lower urinary tract investigation at this time.  I will schedule the patient for cystoscopy in office with Dr. Cedeño for evaluation of either benign enlargement of the prostate or possible stricture.  PSA testing -  patient's last PSA was normal. I discussed the pathophysiology of PSA testing indicating its use in the diagnosis and management of prostate cancer.  I discussed the normal range being 0 to 4, but more appropriately being much closer to 0 to 2 in a normal male.  I discussed the fact that after a certain age it is against recommendation to use PSA testing especially in view of numerous comorbidities.  I discussed many of the things that can artificially raise PSA including put not limited to a recent infection, urinary tract infection, recent sexual intercourse, or even the type of movement such as manipulation of the prostate from riding a bicycle.  It was discussed that the most important use of PSA is the velocity measurement.  This refers to the change of PSA with time. I discussed that we look for  greater than 20% rise over a year to help us make the prediction of prostate cancer.  I also discussed that in the case of prostate cancer indicating a radical prostatectomy, the PSA should be 0 and any rise indicates an early biochemical recurrence.   Vasectomy - presents for consideration of an elective scrotal vasectomy.  I discussed the procedure at length including the risks of local anesthesia, bleeding, infection, testicular pain postoperatively, and a very low chance of long-term testicular pain.  He was informed of the rates of surgical complications such as a symptomatic hematoma and infection in the range of 1 to 2% and clinically from the risk of chronic significant scrotal pain in the range of 1 to 2%.  The patient was informed of a possible failure rate in the range of 1 in 10,000 and the important necessity to have a follow-up in about 8 weeks after the original procedure to be sure that the semen specimen is free of spermatozoa, thus ensuring sterility.  I discussed the various techniques out there including a 1 incision, 2 incision technique as well.  They understand that we will be giving local anesthesia and a mild antibiotic to take in the immediate perioperative period.  If he cannot tolerate the anesthesia for a variety of reasons including body habitus., we are glad to perform it under an anesthetic.  We discussed the technique of reversal when indicated including the approximate rate of 57% and the importance that this is strongly related to the time from the original vasectomy.  However, I stressed the fact that if they are even considering children in the future they should not use this as a form of temporary contraception. Patients can stop contraception once postvasectomy semen specimens show azoospermia or only rare nonmotile spermatozoa in the range of less than 100,000 sperm per mLnts.  I will send in cephalexin for the patient to take preprocedure for infection.  I will also have   [facility] send in Valium to take preop.  I will schedule the patient in office with Dr. Cedeño for elective scrotal vasectomy as soon as possible.  Patient verbalizes understanding and agrees to plan of care.    Visit Diagnoses:    ICD-10-CM ICD-9-CM   1. Prostatitis, chronic  N41.1 601.1   2. Benign prostatic hyperplasia with urinary hesitancy  N40.1 600.01    R39.11 788.64   3. Vasectomy status  Z98.52 V26.52       Meds Ordered During Visit:  New Medications Ordered This Visit   Medications   • cephalexin (Keflex) 500 MG capsule     Sig: Take 1 capsule by mouth 2 (Two) Times a Day. Start the day before procedure.     Dispense:  8 capsule     Refill:  0       Follow Up Appointment:   No follow-ups on file.      This document has been electronically signed by Arron Flores PA-C   February 19, 2023 18:09 EST    Part of this note may be an electronic transcription/translation of spoken language to printed text using the Dragon Dictation System.

## 2023-05-16 ENCOUNTER — PROCEDURE VISIT (OUTPATIENT)
Dept: UROLOGY | Facility: CLINIC | Age: 43
End: 2023-05-16
Payer: COMMERCIAL

## 2023-05-16 VITALS
HEIGHT: 73 IN | DIASTOLIC BLOOD PRESSURE: 96 MMHG | BODY MASS INDEX: 24.09 KG/M2 | HEART RATE: 73 BPM | WEIGHT: 181.8 LBS | SYSTOLIC BLOOD PRESSURE: 132 MMHG

## 2023-05-16 DIAGNOSIS — N41.1 PROSTATITIS, CHRONIC: Primary | ICD-10-CM

## 2023-05-16 DIAGNOSIS — Z48.816 AFTERCARE FOLLOWING SURGERY OF THE GENITOURINARY SYSTEM: ICD-10-CM

## 2023-05-16 RX ORDER — FLUTICASONE PROPIONATE AND SALMETEROL 250; 50 UG/1; UG/1
POWDER RESPIRATORY (INHALATION)
COMMUNITY
Start: 2023-02-13 | End: 2023-05-16 | Stop reason: SDUPTHER

## 2023-05-16 RX ORDER — HYDROXYCHLOROQUINE SULFATE 200 MG/1
TABLET, FILM COATED ORAL
COMMUNITY
Start: 2023-04-04

## 2023-05-16 RX ORDER — KETOPROFEN 50 MG/1
CAPSULE ORAL
COMMUNITY
Start: 2023-04-04 | End: 2023-05-16 | Stop reason: SDUPTHER

## 2023-05-16 RX ORDER — PREDNISONE 5 MG/1
TABLET, DELAYED RELEASE ORAL
COMMUNITY
Start: 2023-03-07

## 2023-05-16 RX ORDER — SULFAMETHOXAZOLE AND TRIMETHOPRIM 800; 160 MG/1; MG/1
1 TABLET ORAL 2 TIMES DAILY
Qty: 84 TABLET | Refills: 2 | Status: SHIPPED | OUTPATIENT
Start: 2023-05-16

## 2023-05-16 RX ORDER — GENTAMICIN SULFATE 40 MG/ML
80 INJECTION, SOLUTION INTRAMUSCULAR; INTRAVENOUS ONCE
Status: COMPLETED | OUTPATIENT
Start: 2023-05-16 | End: 2023-05-16

## 2023-05-16 RX ADMIN — GENTAMICIN SULFATE 80 MG: 40 INJECTION, SOLUTION INTRAMUSCULAR; INTRAVENOUS at 11:00

## 2023-05-16 NOTE — PROGRESS NOTES
Chief Complaint:      Chief Complaint   Patient presents with   • prostatitis        HPI:   43 y.o. male here for cystoscopy    Past Medical History:     Past Medical History:   Diagnosis Date   • Hypertension    • Prostatitis, chronic 08/14/2019   • Rheumatoid arthritis        Current Meds:     Current Outpatient Medications   Medication Sig Dispense Refill   • acetaminophen-codeine (TYLENOL #4) 300-60 MG per tablet Take 1 tablet by mouth Every 4 (Four) Hours As Needed for Moderate Pain.     • Asmanex, 60 Metered Doses, 220 MCG/INH inhaler      • celecoxib (CeleBREX) 200 MG capsule Take 1 capsule by mouth.     • cephalexin (Keflex) 500 MG capsule Take 1 capsule by mouth 2 (Two) Times a Day. Start the day before procedure. 8 capsule 0   • cyclobenzaprine (FLEXERIL) 10 MG tablet Take 1 tablet by mouth 3 (Three) Times a Day As Needed for Muscle Spasms.     • finasteride (PROSCAR) 5 MG tablet Take 1 tablet by mouth Daily. 30 tablet 5   • gabapentin (NEURONTIN) 800 MG tablet Take 1 tablet by mouth 3 (Three) Times a Day.     • hydroxychloroquine (PLAQUENIL) 200 MG tablet      • ibuprofen (ADVIL,MOTRIN) 800 MG tablet Take 1 tablet by mouth Every 6 (Six) Hours As Needed for Mild Pain.     • pantoprazole (PROTONIX) 40 MG EC tablet      • Juan 5 MG tablet delayed-release      • tadalafil (Cialis) 5 MG tablet Take 1 tablet by mouth Daily As Needed for Erectile Dysfunction. Take one Daily 30 tablet 6   • tamsulosin (FLOMAX) 0.4 MG capsule 24 hr capsule Take 1 capsule by mouth Every Night. 30 capsule 5     No current facility-administered medications for this visit.        Allergies:      No Known Allergies     Past Surgical History:     Past Surgical History:   Procedure Laterality Date   • NO PAST SURGERIES         Social History:     Social History     Socioeconomic History   • Marital status:    Tobacco Use   • Smoking status: Former   • Smokeless tobacco: Former   Vaping Use   • Vaping Use: Never used   Substance  and Sexual Activity   • Alcohol use: Yes     Alcohol/week: 2.0 standard drinks     Types: 2 Cans of beer per week     Comment: OCC   • Drug use: No   • Sexual activity: Yes     Partners: Female     Birth control/protection: OCP       Family History:     Family History   Problem Relation Age of Onset   • Osteoarthritis Father    • Hypertension Father    • Cancer Mother    • Cancer Maternal Grandfather    • Cancer Maternal Grandmother    • Cancer Paternal Grandmother        Review of Systems:     Review of Systems   Constitutional: Negative.    HENT: Negative.    Eyes: Negative.    Respiratory: Negative.    Cardiovascular: Negative.    Gastrointestinal: Negative.    Endocrine: Negative.    Genitourinary: Positive for dysuria, frequency, penile discharge and urgency.   Musculoskeletal: Negative.    Allergic/Immunologic: Negative.    Neurological: Negative.    Hematological: Negative.    Psychiatric/Behavioral: Negative.        Physical Exam:     Physical Exam  Vitals and nursing note reviewed.   Constitutional:       Appearance: He is well-developed.   HENT:      Head: Normocephalic and atraumatic.   Eyes:      Conjunctiva/sclera: Conjunctivae normal.      Pupils: Pupils are equal, round, and reactive to light.   Cardiovascular:      Rate and Rhythm: Normal rate and regular rhythm.      Heart sounds: Normal heart sounds.   Pulmonary:      Effort: Pulmonary effort is normal.      Breath sounds: Normal breath sounds.   Abdominal:      General: Bowel sounds are normal.      Palpations: Abdomen is soft.   Genitourinary:     Penis: Normal.       Testes: Normal.   Musculoskeletal:         General: Normal range of motion.      Cervical back: Normal range of motion.   Skin:     General: Skin is warm and dry.   Neurological:      Mental Status: He is alert and oriented to person, place, and time.      Deep Tendon Reflexes: Reflexes are normal and symmetric.   Psychiatric:         Behavior: Behavior normal.         Thought  Content: Thought content normal.         Judgment: Judgment normal.         I have reviewed the following portions of the patient's history: Allergies, current medications, past family history, past medical history, past social history, past surgical history, problem list, and ROS and confirm it is accurate.    Recent Image (CT and/or KUB):      CT Abdomen and Pelvis: No results found for this or any previous visit.       CT Stone Protocol: No results found for this or any previous visit.       KUB: No results found for this or any previous visit.       Labs (past 3 months):      No visits with results within 3 Month(s) from this visit.   Latest known visit with results is:   Office Visit on 02/02/2023   Component Date Value Ref Range Status   • PSA 02/02/2023 0.479  0.000 - 4.000 ng/mL Final   • Color 02/02/2023 Yellow  Yellow, Straw, Dark Yellow, Danielle Final   • Clarity, UA 02/02/2023 Clear  Clear Final   • Specific Gravity  02/02/2023 1.010  1.005 - 1.030 Final   • pH, Urine 02/02/2023 5.5  5.0 - 8.0 Final   • Leukocytes 02/02/2023 Negative  Negative Final   • Nitrite, UA 02/02/2023 Negative  Negative Final   • Protein, POC 02/02/2023 Negative  Negative mg/dL Final   • Glucose, UA 02/02/2023 Negative  Negative mg/dL Final   • Ketones, UA 02/02/2023 Negative  Negative Final   • Urobilinogen, UA 02/02/2023 Normal  Normal, 0.2 E.U./dL Final   • Bilirubin 02/02/2023 Negative  Negative Final   • Blood, UA 02/02/2023 Negative  Negative Final   • Lot Number 02/02/2023 [id number]   Final   • Expiration Date 02/02/2023 122,123   Final   • Urine Culture 02/02/2023 No growth   Final        Procedure:   Cystoscopy:  Patient presents today for cystourethroscopy.  I went ahead and obtained an informed consent including the risks of anesthesia, bleeding, infection, etc.  After prepping and draping in a sterile fashion in the low dorsal lithotomy position, the urethra was gently anesthetized with 10 mL of 2% viscous Xylocaine  jelly.  After an appropriate period of topical anesthesia, I used the Olympus digital 14 Belgian flexible cystoscope to examine the anterior urethra which was completely normal.  The ureteral orifices were visualized and normal in position and configuration. There were no stones, tumors, or foreign bodies.  Prostatic fossa had significant prostatitis the patient was given 80 mg of gentamicin in an intramuscular fashion as prophylaxis for the cystoscopy and released from the clinic.     Assessment/Plan:   Prostatitis-we discussed the differential diagnosis of prostatitis including the National Institutes of Health classification system I through IV.  I discussed that type I prostatitis is acute bacterial prostatitis and associated with high fevers, typically hematuria, and severe pain with voiding.  We discussed the fact that it necessitates 6 weeks of chronic antibiotics to be sure it doesn't turn into a chronic bacterial prostatitis that can have lifelong ramifications.  We discussed National Institutes of Health type II chronic bacterial prostatitis.  We discussed the fact that this a chronic bacterial infection of the prostate that often requires suppressive antibiotics.  We discussed type III being related more to nonspecific urethritis, as well as tight for being related to finding inflammation and a biopsy in the absence of symptomatology.  I discussed the diagnostic strategies including the VB 1 through 4 urine culture and discussed empiric therapy.  I emphasized that with the use of chronic antibiotics, I strongly recommended the concomitant use of probiotics.  Additionally, we discussed the various antibiotics used and the risks and benefits associated with each, particularly the use of long-term ciprofloxacin and the effect on joints and collagen in human beings.      This document has been electronically signed by GAYLE GARCIA MD May 16, 2023 10:30 EDT    Dictated Utilizing Dragon Dictation: Part  of this note may be an electronic transcription/translation of spoken language to printed text using the Dragon Dictation System.

## 2023-08-01 ENCOUNTER — OFFICE VISIT (OUTPATIENT)
Dept: UROLOGY | Facility: CLINIC | Age: 43
End: 2023-08-01
Payer: COMMERCIAL

## 2023-08-01 VITALS
SYSTOLIC BLOOD PRESSURE: 118 MMHG | DIASTOLIC BLOOD PRESSURE: 84 MMHG | BODY MASS INDEX: 23.54 KG/M2 | HEART RATE: 68 BPM | WEIGHT: 177.6 LBS | HEIGHT: 73 IN

## 2023-08-01 DIAGNOSIS — Z98.52 VASECTOMY STATUS: Primary | ICD-10-CM

## 2023-08-01 DIAGNOSIS — N41.1 PROSTATITIS, CHRONIC: ICD-10-CM

## 2023-08-01 NOTE — PROGRESS NOTES
Chief Complaint:      Chief Complaint   Patient presents with    prostatitis     Urinary Urgency     Follow up        HPI:   43 y.o. male returns today he is not better from his prostatitis.  He has classic prostatitis symptoms on alpha blockade not helping him and have him stop alpha blockade him and continue Cialis and remove his vas to September per his request.    Past Medical History:     Past Medical History:   Diagnosis Date    Hypertension     Prostatitis, chronic 08/14/2019    Rheumatoid arthritis        Current Meds:     Current Outpatient Medications   Medication Sig Dispense Refill    acetaminophen-codeine (TYLENOL #4) 300-60 MG per tablet Take 1 tablet by mouth Every 4 (Four) Hours As Needed for Moderate Pain.      Asmanex, 60 Metered Doses, 220 MCG/INH inhaler       celecoxib (CeleBREX) 200 MG capsule Take 1 capsule by mouth.      cephalexin (Keflex) 500 MG capsule Take 1 capsule by mouth 2 (Two) Times a Day. Start the day before procedure. 8 capsule 0    cyclobenzaprine (FLEXERIL) 10 MG tablet Take 1 tablet by mouth 3 (Three) Times a Day As Needed for Muscle Spasms.      finasteride (PROSCAR) 5 MG tablet Take 1 tablet by mouth Daily. 30 tablet 5    gabapentin (NEURONTIN) 800 MG tablet Take 1 tablet by mouth 3 (Three) Times a Day.      hydroxychloroquine (PLAQUENIL) 200 MG tablet       ibuprofen (ADVIL,MOTRIN) 800 MG tablet Take 1 tablet by mouth Every 6 (Six) Hours As Needed for Mild Pain.      pantoprazole (PROTONIX) 40 MG EC tablet       Juan 5 MG tablet delayed-release       sulfamethoxazole-trimethoprim (Bactrim DS) 800-160 MG per tablet Take 1 tablet by mouth 2 (Two) Times a Day. 84 tablet 2    tadalafil (Cialis) 5 MG tablet Take 1 tablet by mouth Daily As Needed for Erectile Dysfunction. Take one Daily 30 tablet 6    tamsulosin (Flomax) 0.4 MG capsule 24 hr capsule Take 1 capsule by mouth Every Night. 30 capsule 5     No current facility-administered medications for this visit.         Allergies:      No Known Allergies     Past Surgical History:     Past Surgical History:   Procedure Laterality Date    NO PAST SURGERIES         Social History:     Social History     Socioeconomic History    Marital status:    Tobacco Use    Smoking status: Former    Smokeless tobacco: Former   Vaping Use    Vaping Use: Never used   Substance and Sexual Activity    Alcohol use: Yes     Alcohol/week: 2.0 standard drinks     Types: 2 Cans of beer per week     Comment: OCC    Drug use: No    Sexual activity: Yes     Partners: Female     Birth control/protection: OCP       Family History:     Family History   Problem Relation Age of Onset    Osteoarthritis Father     Hypertension Father     Cancer Mother     Cancer Maternal Grandfather     Cancer Maternal Grandmother     Cancer Paternal Grandmother        Review of Systems:     Review of Systems   Constitutional: Negative.    HENT: Negative.     Eyes: Negative.    Respiratory: Negative.     Cardiovascular: Negative.    Gastrointestinal: Negative.    Endocrine: Negative.    Genitourinary:  Positive for difficulty urinating and dysuria.   Musculoskeletal: Negative.    Allergic/Immunologic: Negative.    Neurological: Negative.    Hematological: Negative.    Psychiatric/Behavioral: Negative.       Physical Exam:     Physical Exam  Vitals and nursing note reviewed.   Constitutional:       Appearance: He is well-developed.   HENT:      Head: Normocephalic and atraumatic.   Eyes:      Conjunctiva/sclera: Conjunctivae normal.      Pupils: Pupils are equal, round, and reactive to light.   Cardiovascular:      Rate and Rhythm: Normal rate and regular rhythm.      Heart sounds: Normal heart sounds.   Pulmonary:      Effort: Pulmonary effort is normal.      Breath sounds: Normal breath sounds.   Abdominal:      General: Bowel sounds are normal.      Palpations: Abdomen is soft.   Musculoskeletal:         General: Normal range of motion.      Cervical back: Normal range  of motion.   Skin:     General: Skin is warm and dry.   Neurological:      Mental Status: He is alert and oriented to person, place, and time.      Deep Tendon Reflexes: Reflexes are normal and symmetric.   Psychiatric:         Behavior: Behavior normal.         Thought Content: Thought content normal.         Judgment: Judgment normal.       I have reviewed the following portions of the patient's history: Allergies, current medications, past family history, past medical history, past social history, past surgical history, problem list, and ROS and confirm it is accurate.    Recent Image (CT and/or KUB):      CT Abdomen and Pelvis: No results found for this or any previous visit.       CT Stone Protocol: No results found for this or any previous visit.       KUB: No results found for this or any previous visit.       Labs (past 3 months):      No visits with results within 3 Month(s) from this visit.   Latest known visit with results is:   Office Visit on 02/02/2023   Component Date Value Ref Range Status    PSA 02/02/2023 0.479  0.000 - 4.000 ng/mL Final    Color 02/02/2023 Yellow  Yellow, Straw, Dark Yellow, Danielle Final    Clarity, UA 02/02/2023 Clear  Clear Final    Specific Gravity  02/02/2023 1.010  1.005 - 1.030 Final    pH, Urine 02/02/2023 5.5  5.0 - 8.0 Final    Leukocytes 02/02/2023 Negative  Negative Final    Nitrite, UA 02/02/2023 Negative  Negative Final    Protein, POC 02/02/2023 Negative  Negative mg/dL Final    Glucose, UA 02/02/2023 Negative  Negative mg/dL Final    Ketones, UA 02/02/2023 Negative  Negative Final    Urobilinogen, UA 02/02/2023 Normal  Normal, 0.2 E.U./dL Final    Bilirubin 02/02/2023 Negative  Negative Final    Blood, UA 02/02/2023 Negative  Negative Final    Lot Number 02/02/2023 [id number]   Final    Expiration Date 02/02/2023 122,123   Final    Urine Culture 02/02/2023 No growth   Final        Procedure:       Assessment/Plan:   Prostatitis-we discussed the differential  diagnosis of prostatitis including the National Institutes of Health classification system I through IV.  I discussed that type I prostatitis is acute bacterial prostatitis and associated with high fevers, typically hematuria, and severe pain with voiding.  We discussed the fact that it necessitates 6 weeks of chronic antibiotics to be sure it doesn't turn into a chronic bacterial prostatitis that can have lifelong ramifications.  We discussed National Institutes of Health type II chronic bacterial prostatitis.  We discussed the fact that this a chronic bacterial infection of the prostate that often requires suppressive antibiotics.  We discussed type III being related more to nonspecific urethritis, as well as tight for being related to finding inflammation and a biopsy in the absence of symptomatology.  I discussed the diagnostic strategies including the VB 1 through 4 urine culture and discussed empiric therapy.  I emphasized that with the use of chronic antibiotics, I strongly recommended the concomitant use of probiotics.  Additionally, we discussed the various antibiotics used and the risks and benefits associated with each, particularly the use of long-term ciprofloxacin and the effect on joints and collagen in human beings.  Stop alpha blockade this is exacerbating his known symptoms.  Vasectomy status-patient presents for consideration of an elective scrotal vasectomy.  I discussed the procedure at length.  I discussed the fact that it has risks of local anesthesia, bleeding, infection, testicular pain postoperatively shortly, and a very low chance of long-term testicular pain.  Discussed the rates of surgical complications such as a symptomatic hematoma and infection in the range of 1 to 2% and clinically from the risk of chronic significant scrotal pain in the range of 1 to 2%.  Discussed the failure rate in the range of 1 in 10,000 and the important necessity to have a follow-up in about 8 weeks after  the original procedure to be sure that the semen specimen is free of spermatozoa, thus ensuring sterility.  I discussed the various techniques out there including a 1 incision, 2 incision technique as well.  They understand that we will be giving local anesthesia with Valium the night before and the morning of and a mild antibiotic to take in the immediate perioperative period.  If he cannot tolerate the anesthesia for a variety of reasons including body habitus., we are glad to perform it under an anesthetic.  We discussed the technique of reversal when indicated including the approximate rate of 57% and the importance that this is strongly related to the time from the original vasectomy.  However, I stressed the fact that if they are even considering children in the future they should not use this as a form of temporary contraception patients can stop using contraception 1 postvasectomy semen specimens show azoospermia or only rare nonmotile spermatozoa in the range of less than 100,000 sperm per mL            This document has been electronically signed by GAYLE GARCIA MD August 1, 2023 14:47 EDT    Dictated Utilizing Dragon Dictation: Part of this note may be an electronic transcription/translation of spoken language to printed text using the Dragon Dictation System.

## 2023-09-18 ENCOUNTER — TELEPHONE (OUTPATIENT)
Dept: UROLOGY | Facility: CLINIC | Age: 43
End: 2023-09-18
Payer: COMMERCIAL

## 2023-09-19 ENCOUNTER — PROCEDURE VISIT (OUTPATIENT)
Dept: UROLOGY | Facility: CLINIC | Age: 43
End: 2023-09-19
Payer: COMMERCIAL

## 2023-09-19 VITALS
WEIGHT: 177.8 LBS | BODY MASS INDEX: 23.56 KG/M2 | DIASTOLIC BLOOD PRESSURE: 92 MMHG | HEART RATE: 73 BPM | SYSTOLIC BLOOD PRESSURE: 132 MMHG | HEIGHT: 73 IN

## 2023-09-19 DIAGNOSIS — Z98.52 VASECTOMY STATUS: Primary | ICD-10-CM

## 2023-09-19 RX ORDER — HYDROCODONE BITARTRATE AND ACETAMINOPHEN 10; 325 MG/1; MG/1
1 TABLET ORAL EVERY 6 HOURS PRN
Qty: 10 TABLET | Refills: 0 | Status: SHIPPED | OUTPATIENT
Start: 2023-09-19

## 2023-09-19 NOTE — PATIENT INSTRUCTIONS
Vasectomy, Care After  Refer to this sheet in the next few weeks. These instructions provide you with information on caring for yourself after your procedure. Your health care provider may also give you more specific instructions. Your treatment has been planned according to current medical practices, but problems sometimes occur. Call your health care provider if you have any problems or questions after your procedure.    What can I expect after the procedure?  After your procedure, it is typical to have the following:  Slight swelling or redness or both at the surgical site.  Mild pain or discomfort in the scrotum.  Some oozing of blood from the cuts (incisions) made by the surgeon is normal during the first day or two after the procedure.  Blood in the ejaculate is common and typically clears after a few days.    Follow these instructions at home:  Only take over-the-counter or prescription medicines for pain, discomfort, or fever as directed by your health care provider.  Avoid using nonsteroidal anti-inflammatory drugs (NSAIDs) because these can make bleeding worse.  Apply ice to the injured area:  Put ice in a plastic bag.  Place a towel between your skin and the bag.  Leave the ice on for 20 minutes, 2-3 times a day.  Avoid being active for the first 2 days after surgery.  Wear a supporter while moving around for the first week after surgery. You may add some sterile fluffed bandages or a clean washcloth to the scrotal support if the scrotal support irritates your skin.  Do not participate in sports or perform heavy physical labor for at least 2 weeks.  You may have protected intercourse 7-10 days after your procedure. Remember, you are not sterile until follow-up specimens show no sperm in your ejaculate.  Be sure to follow up with your surgeon as instructed to confirm sterility. It usually requires multiple (20 - 30) ejaculations to clear the sperm located beyond the vasectomy site of blockage. You will  need at least two specimens showing an absence of sperm before you can resume unprotected intercourse.    Contact a health care provider if:  You have redness, swelling, or increasing pain in the wounds or testicles (scrotum).  You see pus coming from the wound.  You have a fever.  You notice a foul smell coming from the wound or dressing.  You notice a breaking open of the stitches (suture) line or wound edges even after sutures have been removed.  You have increased bleeding from the wounds.    Get help right away if:  You develop a rash.  You have difficulty breathing.  You have any reaction or side effects to medicines given.               Semen Analysis Lab Instructions - After Vasectomy     Follow these instructions at home:   4-6 weeks after vasectomy or approximately 20 - 30 ejaculations.   Semen specimen must be collected in sterile container provided by our office.  You must have your Name and Date of Birth legibly printed on the sterile container.  Sample must be dropped off at [facility] no more than 30 minutes after sample collected.     This information is not intended to replace advice given to you by your health care provider. Make sure you discuss any questions you have with your health care provider.  Document Released: 07/07/2006 Document Revised: 05/25/2017 Document Reviewed: 07/07/2014  [employer] Interactive Patient Education © 2018 [employer]

## 2023-09-19 NOTE — PROGRESS NOTES
Chief Complaint:      Chief Complaint   Patient presents with    Sterilization       HPI:   43 y.o. male vasectomy status-patient presents for consideration of an elective scrotal vasectomy.  I discussed the procedure at length.  I discussed the fact that it has risks of local anesthesia, bleeding, infection, testicular pain postoperatively shortly, and a very low chance of long-term testicular pain.  Discussed the rates of surgical complications such as a symptomatic hematoma and infection in the range of 1 to 2% and clinically from the risk of chronic significant scrotal pain in the range of 1 to 2%.  Discussed the failure rate in the range of 1 in 10,000 and the important necessity to have a follow-up in about 8 weeks after the original procedure to be sure that the semen specimen is free of spermatozoa, thus ensuring sterility.  I discussed the various techniques out there including a 1 incision, 2 incision technique as well.  They understand that we will be giving local anesthesia with Valium the night before and the morning of and a mild antibiotic to take in the immediate perioperative period.  If he cannot tolerate the anesthesia for a variety of reasons including body habitus., we are glad to perform it under an anesthetic.  We discussed the technique of reversal when indicated including the approximate rate of 57% and the importance that this is strongly related to the time from the original vasectomy.  However, I stressed the fact that if they are even considering children in the future they should not use this as a form of temporary contraception patients can stop using contraception 1 postvasectomy semen specimens show azoospermia or only rare nonmotile spermatozoa in the range of less than 100,000 sperm per mL      Past Medical History:     Past Medical History:   Diagnosis Date    Hypertension     Prostatitis, chronic 08/14/2019    Rheumatoid arthritis        Current Meds:     Current Outpatient  Medications   Medication Sig Dispense Refill    acetaminophen-codeine (TYLENOL #4) 300-60 MG per tablet Take 1 tablet by mouth Every 4 (Four) Hours As Needed for Moderate Pain.      Asmanex, 60 Metered Doses, 220 MCG/INH inhaler       celecoxib (CeleBREX) 200 MG capsule Take 1 capsule by mouth.      cephalexin (Keflex) 500 MG capsule Take 1 capsule by mouth 2 (Two) Times a Day. Start the day before procedure. 8 capsule 0    cyclobenzaprine (FLEXERIL) 10 MG tablet Take 1 tablet by mouth 3 (Three) Times a Day As Needed for Muscle Spasms.      gabapentin (NEURONTIN) 800 MG tablet Take 1 tablet by mouth 3 (Three) Times a Day.      ibuprofen (ADVIL,MOTRIN) 800 MG tablet Take 1 tablet by mouth Every 6 (Six) Hours As Needed for Mild Pain.      pantoprazole (PROTONIX) 40 MG EC tablet       sulfamethoxazole-trimethoprim (Bactrim DS) 800-160 MG per tablet Take 1 tablet by mouth 2 (Two) Times a Day. 84 tablet 2    tadalafil (Cialis) 5 MG tablet Take 1 tablet by mouth Daily As Needed for Erectile Dysfunction. Take one Daily 30 tablet 6    tamsulosin (Flomax) 0.4 MG capsule 24 hr capsule Take 1 capsule by mouth Every Night. 30 capsule 5     No current facility-administered medications for this visit.        Allergies:      No Known Allergies     Past Surgical History:     Past Surgical History:   Procedure Laterality Date    NO PAST SURGERIES         Social History:     Social History     Socioeconomic History    Marital status:    Tobacco Use    Smoking status: Former    Smokeless tobacco: Former   Vaping Use    Vaping Use: Never used   Substance and Sexual Activity    Alcohol use: Yes     Alcohol/week: 2.0 standard drinks     Types: 2 Cans of beer per week     Comment: OCC    Drug use: No    Sexual activity: Yes     Partners: Female     Birth control/protection: OCP       Family History:     Family History   Problem Relation Age of Onset    Osteoarthritis Father     Hypertension Father     Cancer Mother     Cancer  Maternal Grandfather     Cancer Maternal Grandmother     Cancer Paternal Grandmother        Review of Systems:     Review of Systems   Constitutional: Negative.    HENT: Negative.     Eyes: Negative.    Respiratory: Negative.     Cardiovascular: Negative.    Gastrointestinal: Negative.    Endocrine: Negative.    Musculoskeletal: Negative.    Allergic/Immunologic: Negative.    Neurological: Negative.    Hematological: Negative.    Psychiatric/Behavioral: Negative.       Physical Exam:     Physical Exam  Vitals and nursing note reviewed.   Constitutional:       Appearance: He is well-developed.   HENT:      Head: Normocephalic and atraumatic.   Eyes:      Conjunctiva/sclera: Conjunctivae normal.      Pupils: Pupils are equal, round, and reactive to light.   Cardiovascular:      Rate and Rhythm: Normal rate and regular rhythm.      Heart sounds: Normal heart sounds.   Pulmonary:      Effort: Pulmonary effort is normal.      Breath sounds: Normal breath sounds.   Abdominal:      General: Bowel sounds are normal.      Palpations: Abdomen is soft.   Genitourinary:     Penis: Normal.       Testes: Normal.   Musculoskeletal:         General: Normal range of motion.      Cervical back: Normal range of motion.   Skin:     General: Skin is warm and dry.   Neurological:      Mental Status: He is alert and oriented to person, place, and time.      Deep Tendon Reflexes: Reflexes are normal and symmetric.   Psychiatric:         Behavior: Behavior normal.         Thought Content: Thought content normal.         Judgment: Judgment normal.       I have reviewed the following portions of the patient's history: Allergies, current medications, past family history, past medical history, past social history, past surgical history, problem list, and ROS and confirm it is accurate.    Recent Image (CT and/or KUB):      CT Abdomen and Pelvis: No results found for this or any previous visit.       CT Stone Protocol: No results found for this  or any previous visit.       KUB: No results found for this or any previous visit.       Labs (past 3 months):      No visits with results within 3 Month(s) from this visit.   Latest known visit with results is:   Office Visit on 02/02/2023   Component Date Value Ref Range Status    PSA 02/02/2023 0.479  0.000 - 4.000 ng/mL Final    Color 02/02/2023 Yellow  Yellow, Straw, Dark Yellow, Danielle Final    Clarity, UA 02/02/2023 Clear  Clear Final    Specific Gravity  02/02/2023 1.010  1.005 - 1.030 Final    pH, Urine 02/02/2023 5.5  5.0 - 8.0 Final    Leukocytes 02/02/2023 Negative  Negative Final    Nitrite, UA 02/02/2023 Negative  Negative Final    Protein, POC 02/02/2023 Negative  Negative mg/dL Final    Glucose, UA 02/02/2023 Negative  Negative mg/dL Final    Ketones, UA 02/02/2023 Negative  Negative Final    Urobilinogen, UA 02/02/2023 Normal  Normal, 0.2 E.U./dL Final    Bilirubin 02/02/2023 Negative  Negative Final    Blood, UA 02/02/2023 Negative  Negative Final    Lot Number 02/02/2023 [id number]   Final    Expiration Date 02/02/2023 122,123   Final    Urine Culture 02/02/2023 No growth   Final        Procedure:   Elective scrotal vasectomy -After an appropriate informed consent including the risks of anesthesia, infection, scrotal hematoma, failure in the range of 1 in 10,000, chronic testalgia, low testosterone, as well as the other very rare complications identified.  He was brought to the operative suite.  His scrotum was shaved and prepped in a sterile fashion using Betadine.  Local anesthetic was infiltrated into the midline scrotal raphae.  A midline scrotal incision was made and the right vas entrapped.  I anesthetized the spermatic cord and skin using 5 mL of 1% Xylocaine with epinephrine after an adequate period of analgesia.  I identified the vas and brought out into the incision.  The fascia was divided.  The vas was then doubly ligated, fulgurated, and sewn in the sheath away from the proximal  end.  Bovie electrocautery had been used to fulgurate the end of both vasa as per the AUA guidelines.  Hemostasis was excellent and it was allowed to fall back in the scrotal sac.  The identical procedure was done on the contralateral side.  The skin was closed with a 3-0 chromic sutures.  Hemostasis was excellent.  He was recommended to use ice pack with a shield covering the scrotum to protect it from the ice.  He was given pain medication and antibiotics.  The incision was covered with bacitracin ointment.  The patient will be seen in 8 weeks whereupon we will then check a semen analysis to confirm the absence of spermatozoa and therby declare sterility.      Assessment/Plan:   Vasectomy status-patient presents for consideration of an elective scrotal vasectomy.  I discussed the procedure at length.  I discussed the fact that it has risks of local anesthesia, bleeding, infection, testicular pain postoperatively shortly, and a very low chance of long-term testicular pain.  Discussed the rates of surgical complications such as a symptomatic hematoma and infection in the range of 1 to 2% and clinically from the risk of chronic significant scrotal pain in the range of 1 to 2%.  Discussed the failure rate in the range of 1 in 10,000 and the important necessity to have a follow-up in about 8 weeks after the original procedure to be sure that the semen specimen is free of spermatozoa, thus ensuring sterility.  I discussed the various techniques out there including a 1 incision, 2 incision technique as well.  They understand that we will be giving local anesthesia with Valium the night before and the morning of and a mild antibiotic to take in the immediate perioperative period.  If he cannot tolerate the anesthesia for a variety of reasons including body habitus., we are glad to perform it under an anesthetic.  We discussed the technique of reversal when indicated including the approximate rate of 57% and the importance  that this is strongly related to the time from the original vasectomy.  However, I stressed the fact that if they are even considering children in the future they should not use this as a form of temporary contraception patients can stop using contraception 1 postvasectomy semen specimens show azoospermia or only rare nonmotile spermatozoa in the range of less than 100,000 sperm per mL  Narcotic pain medication-patient has significant acute pain that I believe would be an indication for the use of narcotic pain medication.  I discussed the significant risks of pain medication and the fact that this will be a short only option and I will give her no more than a three-day supply of pain medication, I will not plan long-term medication, and that this will be sent to a pain clinic if it at all becomes necessary.  We discussed signing a pain medication agreement and the fact that we're going to run a state LIBAN review to be sure the patient is not getting pain medication from elsewhere.  If this is the case, we will not give pain medication as part of the patient's treatment plan of there being prescribed a controlled substance with potential for abuse.  This patient has been well aware of the appropriate dose of such medications including the risks for somnolence, limited ability to drive and/or safety and the significant potential for overdose.  It has been made clear that these medications are for the prescribed patient only without concomitant use of alcohol or other substance unless prescribed by the medical provider.  Has completed prescribing agreement detailing the terms of continue prescribing him a controlled substance including monitoring Liban reports, the possibility of urine drug screens, and pill counts.  The patient is aware that we review LIBAN reports on a regular basis and scan them into the chart.  History and physical examination exhibited continued safe and appropriate use of controlled  substances. We also discussed the fact that the new [place] legislation allows only a three-day prescription for pain medication.  In this situation he will be referred to a chronic pain clinic.        This document has been electronically signed by GAYLE GARCIA MD September 19, 2023 11:18 EDT    Dictated Utilizing Dragon Dictation: Part of this note may be an electronic transcription/translation of spoken language to printed text using the Dragon Dictation System.

## 2023-11-14 ENCOUNTER — LAB (OUTPATIENT)
Dept: UROLOGY | Facility: CLINIC | Age: 43
End: 2023-11-14
Payer: COMMERCIAL

## 2023-11-14 DIAGNOSIS — Z98.52 STATUS POST VASECTOMY: Primary | ICD-10-CM

## 2023-11-14 LAB
MOTILITY - POST VASECTOMY: ABNORMAL
SPERM - POST VASECTOMY: ABNORMAL

## 2023-11-14 PROCEDURE — 89321 SEMEN ANAL SPERM DETECTION: CPT | Performed by: UROLOGY

## 2024-06-05 ENCOUNTER — OFFICE VISIT (OUTPATIENT)
Dept: FAMILY MEDICINE CLINIC | Facility: CLINIC | Age: 44
End: 2024-06-05
Payer: COMMERCIAL

## 2024-06-05 VITALS
HEIGHT: 73 IN | HEART RATE: 83 BPM | TEMPERATURE: 97.9 F | OXYGEN SATURATION: 96 % | WEIGHT: 194 LBS | DIASTOLIC BLOOD PRESSURE: 84 MMHG | SYSTOLIC BLOOD PRESSURE: 118 MMHG | BODY MASS INDEX: 25.71 KG/M2

## 2024-06-05 DIAGNOSIS — M62.830 SPASM OF BOTH TRAPEZIUS MUSCLES: Primary | ICD-10-CM

## 2024-06-05 DIAGNOSIS — M54.2 NECK PAIN, CHRONIC: ICD-10-CM

## 2024-06-05 DIAGNOSIS — M54.50 CHRONIC MIDLINE LOW BACK PAIN WITHOUT SCIATICA: ICD-10-CM

## 2024-06-05 DIAGNOSIS — M50.30 DDD (DEGENERATIVE DISC DISEASE), CERVICAL: Chronic | ICD-10-CM

## 2024-06-05 DIAGNOSIS — M06.9 RHEUMATOID ARTHRITIS INVOLVING MULTIPLE SITES, UNSPECIFIED WHETHER RHEUMATOID FACTOR PRESENT: ICD-10-CM

## 2024-06-05 DIAGNOSIS — G89.29 CHRONIC MIDLINE LOW BACK PAIN WITHOUT SCIATICA: ICD-10-CM

## 2024-06-05 DIAGNOSIS — G89.29 NECK PAIN, CHRONIC: ICD-10-CM

## 2024-06-05 DIAGNOSIS — M51.36 LUMBAR DEGENERATIVE DISC DISEASE: Chronic | ICD-10-CM

## 2024-06-05 PROCEDURE — 99204 OFFICE O/P NEW MOD 45 MIN: CPT | Performed by: PHYSICIAN ASSISTANT

## 2024-06-05 PROCEDURE — 96372 THER/PROPH/DIAG INJ SC/IM: CPT | Performed by: PHYSICIAN ASSISTANT

## 2024-06-05 RX ORDER — ADALIMUMAB 40MG/0.4ML
KIT SUBCUTANEOUS
COMMUNITY
Start: 2024-05-31

## 2024-06-05 RX ORDER — TRIAMCINOLONE ACETONIDE 40 MG/ML
40 INJECTION, SUSPENSION INTRA-ARTICULAR; INTRAMUSCULAR ONCE
Status: COMPLETED | OUTPATIENT
Start: 2024-06-05 | End: 2024-06-05

## 2024-06-05 RX ORDER — TRAMADOL HYDROCHLORIDE 50 MG/1
50 TABLET ORAL EVERY 8 HOURS PRN
COMMUNITY

## 2024-06-05 RX ORDER — EPINEPHRINE 0.3 MG/.3ML
INJECTION SUBCUTANEOUS
COMMUNITY
Start: 2024-05-24

## 2024-06-05 RX ORDER — BACLOFEN 10 MG/1
10 TABLET ORAL 3 TIMES DAILY
Qty: 90 TABLET | Refills: 2 | Status: SHIPPED | OUTPATIENT
Start: 2024-06-05

## 2024-06-05 RX ORDER — LIDOCAINE HYDROCHLORIDE 10 MG/ML
3 INJECTION, SOLUTION INFILTRATION; PERINEURAL ONCE
Status: COMPLETED | OUTPATIENT
Start: 2024-06-05 | End: 2024-06-05

## 2024-06-05 RX ORDER — PREDNISONE 20 MG/1
40 TABLET ORAL DAILY
Qty: 10 TABLET | Refills: 0 | Status: SHIPPED | OUTPATIENT
Start: 2024-06-05 | End: 2024-06-10

## 2024-06-05 RX ADMIN — TRIAMCINOLONE ACETONIDE 40 MG: 40 INJECTION, SUSPENSION INTRA-ARTICULAR; INTRAMUSCULAR at 14:08

## 2024-06-05 RX ADMIN — LIDOCAINE HYDROCHLORIDE 3 ML: 10 INJECTION, SOLUTION INFILTRATION; PERINEURAL at 14:07

## 2024-06-05 NOTE — PROGRESS NOTES
Subjective        Chief Complaint  Neck Pain and Back Pain (Upper back pain )    Subjective      Amor Avila is a 44 y.o. male who presents today to [facility] for Neck Pain and Back Pain (Upper back pain ). He has a past medical history of DDD with chronic neck/back pain, muscle spasms, Hypertension, Prostatitis, and Rheumatoid arthritis.    Neck Pain and Back Pain (Upper back pain ):  Reports a several year history of chronic neck, back, left shoulder pain.  He also has right-sided carpal tunnel syndrome and left-sided cubital tunnel syndrome.  He is followed with orthopedics in the past without surgical recommendations.  MRI C-spine in 2020 showed C5-C6 disc desiccation and broad-based posterior disc bulging causing mild left neuroforaminal and lateral recess stenosis.  C6-C7 broad-based posterior disc bulging causing left neural foraminal and left lateral recess stenosis.  MRI L-spine in 12/2020 showed degenerative disc changes in the lower 2 lumbar disc spaces.  Osteoarthritis in the facet joints that does mildly narrow the lateral recesses at L4-L5.  He reports having a MRI of the left shoulder within the last year or so with reported stretching of the AC noted. No surgical intervention recommended at that time.  Report not currently available.    Previously had trigger point injections, physical therapy, massage therapy, TENS unit.  Previously tried medical therapy includes Zanaflex, Flexeril, Robaxin, baclofen, meloxicam, Celebrex, duloxetine, Tylenol No. 4, tramadol, gabapentin.  Nothing has ever given lasting relief.  Has had a few months of relief with trigger point injections in the past.    Hypertension:   BP in the office today is 118/84.  Not currently on any medical therapy.    Rheumatoid arthritis:   Following with rheumatology at [facility]..  Currently managed on Humira.  Has been on methotrexate, Plaquenil in the past.  Joints affected are mostly his  "hands and ankles.    Asthma:   Allergies:   Currently managed on Asmanex inhaler, stable.      Current Outpatient Medications:     acetaminophen-codeine (TYLENOL #4) 300-60 MG per tablet, Take 1 tablet by mouth Every 4 (Four) Hours As Needed for Moderate Pain., Disp: , Rfl:     Asmanex, 60 Metered Doses, 220 MCG/INH inhaler, , Disp: , Rfl:     EPINEPHrine (EPIPEN) 0.3 MG/0.3ML solution auto-injector injection, , Disp: , Rfl:     gabapentin (NEURONTIN) 800 MG tablet, Take 1 tablet by mouth 3 (Three) Times a Day., Disp: , Rfl:     Humira, 2 Pen, 40 MG/0.4ML Pen-injector Kit, , Disp: , Rfl:     ibuprofen (ADVIL,MOTRIN) 800 MG tablet, Take 1 tablet by mouth Every 6 (Six) Hours As Needed for Mild Pain., Disp: , Rfl:     pantoprazole (PROTONIX) 40 MG EC tablet, , Disp: , Rfl:     tadalafil (Cialis) 5 MG tablet, Take 1 tablet by mouth Daily As Needed for Erectile Dysfunction. Take one Daily, Disp: 30 tablet, Rfl: 6    tamsulosin (Flomax) 0.4 MG capsule 24 hr capsule, Take 1 capsule by mouth Every Night., Disp: 30 capsule, Rfl: 5    traMADol (ULTRAM) 50 MG tablet, Take 1 tablet by mouth Every 8 (Eight) Hours As Needed for Moderate Pain., Disp: , Rfl:     baclofen (LIORESAL) 10 MG tablet, Take 1 tablet by mouth 3 (Three) Times a Day., Disp: 90 tablet, Rfl: 2      No Known Allergies    Objective     Objective   Vital Signs:  /84   Pulse 83   Temp 97.9 °F (36.6 °C) (Temporal)   Ht 185.4 cm (72.99\")   Wt 88 kg (194 lb)   SpO2 96%   BMI 25.60 kg/m²   Estimated body mass index is 25.6 kg/m² as calculated from the following:    Height as of this encounter: 185.4 cm (72.99\").    Weight as of this encounter: 88 kg (194 lb).    BMI is >= 25 and <30. (Overweight) The following options were offered after discussion;: weight loss educational material (shared in after visit summary)    Past Medical History:   Diagnosis Date    Hypertension     Prostatitis, chronic 08/14/2019    Rheumatoid arthritis      Past Surgical History: "   Procedure Laterality Date    NO PAST SURGERIES       Social History     Socioeconomic History    Marital status:    Tobacco Use    Smoking status: Never     Passive exposure: Never    Smokeless tobacco: Never   Vaping Use    Vaping status: Never Used   Substance and Sexual Activity    Alcohol use: Yes     Alcohol/week: 2.0 standard drinks of alcohol     Types: 2 Cans of beer per week     Comment: OCC    Drug use: No    Sexual activity: Yes     Partners: Female     Birth control/protection: OCP      Physical Exam  Vitals and nursing note reviewed.   Constitutional:       General: He is not in acute distress.     Appearance: He is well-developed. He is not diaphoretic.   HENT:      Head: Normocephalic and atraumatic.      Right Ear: Tympanic membrane normal.      Left Ear: Tympanic membrane normal.   Eyes:      General: No scleral icterus.        Right eye: No discharge.         Left eye: No discharge.      Conjunctiva/sclera: Conjunctivae normal.   Neck:      Thyroid: No thyroid mass, thyromegaly or thyroid tenderness.      Vascular: No carotid bruit.      Trachea: Trachea normal.   Cardiovascular:      Rate and Rhythm: Normal rate and regular rhythm.      Heart sounds: Normal heart sounds. No murmur heard.     No friction rub. No gallop.   Pulmonary:      Effort: Pulmonary effort is normal. No respiratory distress.      Breath sounds: Normal breath sounds. No wheezing or rales.   Chest:      Chest wall: No tenderness.   Musculoskeletal:         General: Tenderness present. Normal range of motion.      Cervical back: Normal range of motion and neck supple.      Comments: Multiple trigger points noted in the left and right trapezius. Worst is in the left upper trapezius. Pain reproduced with palpation. Slight asymmetry of the trapezius, increased on the left. No erythema or ecchymosis.    Lymphadenopathy:      Cervical: No cervical adenopathy.      Upper Body:      Right upper body: No supraclavicular  adenopathy.      Left upper body: No supraclavicular adenopathy.   Skin:     General: Skin is warm and dry.      Coloration: Skin is not pale.      Findings: No erythema or rash.   Neurological:      Mental Status: He is alert and oriented to person, place, and time.   Psychiatric:         Behavior: Behavior normal.        Result Review :  The following data was reviewed by: LESLIE Bragg on 06/05/2024:  HDL Cholesterol   Date Value Ref Range Status   09/02/2021 50 40 - 60 mg/dL Final     LDL Cholesterol    Date Value Ref Range Status   09/02/2021 140 (H) 0 - 100 mg/dL Final     Triglycerides   Date Value Ref Range Status   09/02/2021 141 0 - 150 mg/dL Final     Procedure: Trigger point injection bilateral trapezius  The procedure risks and benefits were explained to patient and the patient gave verbal consent to have the procedure performed.  Indication: Trapezius muscle spasm.  Provider: Misa Barragan PA-C  Description: The cervical and trapezius musculature was prepped and draped in sterile fashion.  2 areas/points of maximal tenderness were identified.  An injection was given into each point. The areas were numbed with 1mL of 1% lidocaine without epi. This was followed by two injections of 0.5 ml of 1% lidocaine and 0.5mL triamcinolone acetate mixed in 3 ml  syringe.  2 points of maximum tenderness were injected. Pressure was held and sterile dressings were placed.  No complications were noted  Estimated blood loss: Minimal  Patient tolerated the procedure well.  No immediate complications     One percent lidocaine 500 mg per 50 mL NDC #83344-990-73 LOT: [id number] EXP: 03/01/2027  Triamcinolone acetate 40 mg per 1 mL NDC #7018-6111-01 LOT: [id number] EXP: 01/01/2026        Assessment / Plan         Assessment   Diagnoses and all orders for this visit:    1. Spasm of both trapezius muscles (Primary)  2. Neck pain, chronic  3. Chronic midline low back pain without sciatica  4. DDD (degenerative disc disease),  cervical  5. Lumbar degenerative disc disease  Chronic pain and limitations in mobility/activity despite trying multiple therapies. Discussed pain management referral for evaluation, he is agreeable. Referral sent to [facility].   Agreeable to repeat trial of baclofen 10mg TID PRN.   Received 2 trigger point injections in the office today. Discussed monitoring for increased swelling, pain, redness, fever, chills, etc.   He's going on vacation next week, will send RX for prednisone 40mg daily x 5 days to have on hand if no improvement noted after trigger points.  -     predniSONE (DELTASONE) 20 MG tablet; Take 2 tablets by mouth Daily for 5 days.  Dispense: 10 tablet; Refill: 0  -     lidocaine (XYLOCAINE) 1 % injection 3 mL  -     triamcinolone acetonide (KENALOG-40) injection 40 mg  -     Ambulatory Referral to Pain Management        -     baclofen (LIORESAL) 10 MG tablet; Take 1 tablet by mouth 3 (Three) Times a Day.  Dispense: 90 tablet; Refill: 2    6. Rheumatoid arthritis involving multiple sites, unspecified whether rheumatoid factor present  On Humira.   Continue follow up with [facility].        New Medications Ordered This Visit   Medications    predniSONE (DELTASONE) 20 MG tablet     Sig: Take 2 tablets by mouth Daily for 5 days.     Dispense:  10 tablet     Refill:  0    baclofen (LIORESAL) 10 MG tablet     Sig: Take 1 tablet by mouth 3 (Three) Times a Day.     Dispense:  90 tablet     Refill:  2    lidocaine (XYLOCAINE) 1 % injection 3 mL    triamcinolone acetonide (KENALOG-40) injection 40 mg     Health Maintenance:  Had colonoscopy several years ago with 5 yr repeat recommended 2/2 polyps. He did have the repeat with no concerning findings. 10 yr repeat then recommended.   Consider updating Tdap.     Follow Up   Return if symptoms worsen or fail to improve.    Patient was given instructions and counseling regarding his condition or for health maintenance advice. Please see specific  information pulled into the AVS if appropriate.       This document has been electronically signed by LESLIE Bragg   June 11, 2024 07:50 EDT    Dictated Utilizing Dragon Dictation: Part of this note may be an electronic transcription/translation of spoken language to printed text using the Dragon Dictation System.

## 2024-06-12 ENCOUNTER — PATIENT ROUNDING (BHMG ONLY) (OUTPATIENT)
Dept: FAMILY MEDICINE CLINIC | Facility: CLINIC | Age: 44
End: 2024-06-12
Payer: COMMERCIAL

## 2024-06-12 NOTE — PROGRESS NOTES
June 12, 2024    Hello, may I speak with Amor Avila?    My name is [email or web address] valenzuela      I am  with [facility]  [address] [place] [id number] [phone].    Before we get started may I verify your date of birth? 1980    I am calling to officially welcome you to our practice and ask about your recent visit. Is this a good time to talk? Was unable to reach     Tell me about your visit with us. What things went well?         We're always looking for ways to make our patients' experiences even better. Do you have recommendations on ways we may improve?      Overall were you satisfied with your first visit to our practice?        I appreciate you taking the time to speak with me today. Is there anything else I can do for you?       Thank you, and have a great day.

## 2024-06-26 ENCOUNTER — OFFICE VISIT (OUTPATIENT)
Dept: PAIN MEDICINE | Facility: CLINIC | Age: 44
End: 2024-06-26
Payer: COMMERCIAL

## 2024-06-26 VITALS — HEIGHT: 73 IN | BODY MASS INDEX: 25.84 KG/M2 | WEIGHT: 195 LBS

## 2024-06-26 DIAGNOSIS — M48.062 SPINAL STENOSIS OF LUMBAR REGION WITH NEUROGENIC CLAUDICATION: ICD-10-CM

## 2024-06-26 DIAGNOSIS — M54.12 CERVICAL RADICULOPATHY: ICD-10-CM

## 2024-06-26 DIAGNOSIS — M54.12 CERVICAL RADICULOPATHY: Primary | ICD-10-CM

## 2024-06-26 DIAGNOSIS — M48.061 SPINAL STENOSIS OF LUMBAR REGION, UNSPECIFIED WHETHER NEUROGENIC CLAUDICATION PRESENT: Primary | ICD-10-CM

## 2024-06-26 PROCEDURE — 99204 OFFICE O/P NEW MOD 45 MIN: CPT | Performed by: STUDENT IN AN ORGANIZED HEALTH CARE EDUCATION/TRAINING PROGRAM

## 2024-06-26 NOTE — PROGRESS NOTES
Referring Physician: Misa Barragan PA  [address] [place] [id number]    Primary Physician: Misa Barragan PA    CHIEF COMPLAINT or REASON FOR VISIT: Neck Pain (New patient)      Initial history of present illness on 06/26/2024:  Mr. Amor Avila is 44 y.o. male who presents as a new patient referral for evaluation treatment of chronic neck pain with radiation to bilateral upper extremities and hands.  On his right hand this radiates into his thumb index and long finger, in his left hand this radiates into the pinky and ring fingers.  He has been evaluated in the past for this issue by Dr. Flores, neurosurgery, did not identify any surgical intervention necessary at the time.  He does also have bilateral peripheral neuropathy in his feet.  He has been evaluated by rheumatology in the past, was initially diagnosed with rheumatoid arthritis and given Humira which was not helpful and subsequently had further testing which removed the diagnosis of rheumatoid arthritis.  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  He has never had any injection or intervention.  Patient denies any bowel or bladder dysfunction, lower extremity weakness, new onset saddle anesthesia or unexplained weight loss.   Additionally describes increasing bilateral posterior thigh heaviness and weakness with ambulation and standing.    Interval history:    Interventions:      Objective Pain Scoring:   BRIEF PAIN INVENTORY:  Total score:   Pain Score    06/26/24 1128   PainSc:   8   PainLoc: Neck      PHQ-2: PHQ-2 Total Score: 3  PHQ-9: PHQ-9: Brief Depression Severity Measure Score: 14  Opioid Risk Tool:         Review of Systems:   ROS negative except as otherwise noted     Past Medical History:   Past Medical History:   Diagnosis Date    Cervical disc disorder     Chronic pain disorder     Extremity pain     Headache     Hypertension     Joint pain     Low back pain     Migraine     Neck  pain     Peripheral neuropathy     Prostatitis, chronic 08/14/2019    Rheumatoid arthritis          Past Surgical History:   Past Surgical History:   Procedure Laterality Date    NO PAST SURGERIES      TRIGGER POINT INJECTION           Family History   Family History   Problem Relation Age of Onset    Cancer Mother     Osteoarthritis Father     Hypertension Father     Cancer Maternal Grandmother     Cancer Maternal Grandfather     Cancer Paternal Grandmother          Social History   Social History     Socioeconomic History    Marital status:    Tobacco Use    Smoking status: Never     Passive exposure: Never    Smokeless tobacco: Never    Tobacco comments:     Former smokeless tobacco user   Vaping Use    Vaping status: Never Used   Substance and Sexual Activity    Alcohol use: Yes     Alcohol/week: 2.0 standard drinks of alcohol     Types: 2 Cans of beer per week     Comment: OCC    Drug use: No    Sexual activity: Yes     Partners: Female     Birth control/protection: Vasectomy, OCP        Medications:     Current Outpatient Medications:     acetaminophen-codeine (TYLENOL #4) 300-60 MG per tablet, Take 1 tablet by mouth Every 4 (Four) Hours As Needed for Moderate Pain., Disp: , Rfl:     Asmanex, 60 Metered Doses, 220 MCG/INH inhaler, , Disp: , Rfl:     baclofen (LIORESAL) 10 MG tablet, Take 1 tablet by mouth 3 (Three) Times a Day., Disp: 90 tablet, Rfl: 2    EPINEPHrine (EPIPEN) 0.3 MG/0.3ML solution auto-injector injection, , Disp: , Rfl:     gabapentin (NEURONTIN) 800 MG tablet, Take 1 tablet by mouth 3 (Three) Times a Day., Disp: , Rfl:     Humira, 2 Pen, 40 MG/0.4ML Pen-injector Kit, , Disp: , Rfl:     ibuprofen (ADVIL,MOTRIN) 800 MG tablet, Take 1 tablet by mouth Every 6 (Six) Hours As Needed for Mild Pain., Disp: , Rfl:     pantoprazole (PROTONIX) 40 MG EC tablet, , Disp: , Rfl:     tadalafil (Cialis) 5 MG tablet, Take 1 tablet by mouth Daily As Needed for Erectile Dysfunction. Take one Daily, Disp:  "30 tablet, Rfl: 6    tamsulosin (Flomax) 0.4 MG capsule 24 hr capsule, Take 1 capsule by mouth Every Night., Disp: 30 capsule, Rfl: 5    traMADol (ULTRAM) 50 MG tablet, Take 1 tablet by mouth Every 8 (Eight) Hours As Needed for Moderate Pain., Disp: , Rfl:         Physical Exam:     Vitals:    06/26/24 1128   Weight: 88.5 kg (195 lb)   Height: 185.4 cm (73\")   PainSc:   8   PainLoc: Neck        General: Alert and oriented, No acute distress.   HEENT: Normocephalic, atraumatic.   Cardiovascular: No gross edema  Respiratory: Respirations are non-labored    Cervical Spine:   No masses or atrophy  Range of motion - Flexion normal. Extension normal. Lateral rotation normal.   Palpation - nontender   Spurling's -positive bilaterally    Thoracic Spine:   Inspection: no gross abnormality  Paraspinal muscle palpation: nontender  Spinous process palpation: nontender    Lumbar Spine:   No masses or atrophy  Range of motion - Flexion normal. Extension normal.    Facet Loading: Negative bilaterally  Facet Palpation - Nontender   Jeffrey finger/Gaenslen's/Gopi's/EBONY/Thigh thrust -   Straight leg raise/slump test: Negative bilaterally      Motor Exam:    Strength: Rate on 1-5 scale Right Left    C5-Elbow flexion, Deltoid 5/5  5/5    C6-Wrist extension 5/5  5/5    C7- Elbow / finger extension 5/5  5/5    C8- Finger flexion 5/5  5/5    T1- Intrinsics hand 5/5  5/5    Strength: Rate on 1-5 scale Right Left    L1/2- hip flexion 5/5  5/5    L3- knee extension 5/5  5/5    L4- ankle dorsiflexion 5/5  5/5    L5- great toe extension 5/5  5/5    S1- ankle plantarflexion 5/5  5/5    Sensory Exam: Altered sensation right C5-6 dermatome, left C8 dermatome  Bilateral shoulder exams:  Guzman Obi negative  Empty can negative  AC cross body test negative  Neer's test negative      Neurologic: Cranial Nerves II-XII are grossly intact.   Bailey’s -negative bilaterally     Psychiatric: Cooperative.   Gait: Normal   Assistive " Devices: None    Imaging Studies:   Results for orders placed during the hospital encounter of 12/16/20    MRI Lumbar Spine Without Contrast    Narrative  MRI LUMBAR SPINE WITHOUT CONTRAST-    REASON FOR EXAM:  Low back pain, > 6 wks; G56.03-Carpal tunnel syndrome,  bilateral upper limbs; M51.36-Other intervertebral disc degeneration,  lumbar region    TECHNIQUE: In the sagittal and axial imaging planes T1 and T2 weighted  sequences were acquired through the lumbar spine.  The axial sequences  were acquired through the lumbar disc spaces.    MRI FINDINGS : The lumbar vertebral bodies are normal in height and  alignment. The vertebral bodies show normal marrow signal. The upper  lumbar discs were well maintained in height and show bright signal  intensity. There is loss of disc height and disc desiccation at L4-L5  and L5-S1. In the axial plane at L4-L5, there is mild facet joint  arthritis slightly narrowing the lateral recesses. There is mild  broad-based disc bulging as well. At L5-S1, the spinal canal was  maintained in contour. There is mild facet joint arthritis as well.    Impression  Degenerative disc changes in the lower 2 lumbar disc spaces.  There is osteoarthritis in the facet joints that does mildly narrow the  lateral recesses at L4-L5.    This report was finalized on 12/16/2020 10:32 AM by Dr. Bi Jenkins II, MD.        Independent review of radiographic imaging: Available for my interpretation is a cervical MRI dated October 20, 2020: There is left C3/C4 neuroforaminal stenosis; there is left C5/C6 neuroforaminal stenosis; there is mild bilateral C6/C7 foraminal stenosis; canal is patent.    Impression & Plan:       06/26/2024: Amor Avila is a 44 y.o. male with past medical history significant for peripheral neuropathy who presents to the pain clinic for evaluation and treatment of chronic neck pain with radiation to bilateral upper extremities, chronic bilateral lower extremity heaviness and  weakness with ambulation.  Upper extremity symptoms consistent with cervical radiculopathy, lower extremity symptoms consistent with lumbar spinal stenosis neurogenic claudication.  MRI interpretation of old cervical MRI as above.  Will obtain new cervical MRI and lumbar MRI to evaluate for cervical radiculopathy, lumbar spinal stenosis with neurogenic claudication.  I will also give him referral to physical therapy.    1. Cervical radiculopathy    2. Spinal stenosis of lumbar region with neurogenic claudication        PLAN:  1. Medication Recommendations: Recommend Voltaren topical, NSAIDs, Tylenol.  Can trial turmeric 500 mg twice daily if NSAID contraindicated.    2. Physical Therapy: Referral given today    3. Psychological: defer    4. Complementary and alternative (CAM) Therapies:     5. Labs/Diagnostic studies: Can consider EMG/NCV    6. Imaging: Obtain cervical MRI without contrast, lumbar MRI without contrast.  MRI independently interpreted and reviewed with patient    7. Interventions: None indicated     8. Referrals: Physical therapy    9. Records: n/a    10. Lifestyle goals:    Follow-up after MRI      [facility]  Aydin Savage MD          Quality Metrics:      Patient screened positive for depression based on a PHQ-9 score of 14 on 6/26/2024. Follow-up recommendations include: PCP managing depression.

## 2024-07-19 ENCOUNTER — HOSPITAL ENCOUNTER (OUTPATIENT)
Dept: MRI IMAGING | Facility: HOSPITAL | Age: 44
Discharge: HOME | End: 2024-07-19
Payer: COMMERCIAL

## 2024-07-19 DIAGNOSIS — M54.12 CERVICAL RADICULOPATHY: ICD-10-CM

## 2024-07-19 DIAGNOSIS — M48.061 SPINAL STENOSIS OF LUMBAR REGION, UNSPECIFIED WHETHER NEUROGENIC CLAUDICATION PRESENT: ICD-10-CM

## 2024-07-19 PROCEDURE — 72141 MRI NECK SPINE W/O DYE: CPT

## 2024-07-19 PROCEDURE — 72148 MRI LUMBAR SPINE W/O DYE: CPT

## 2024-07-31 ENCOUNTER — OFFICE VISIT (OUTPATIENT)
Dept: PAIN MEDICINE | Facility: CLINIC | Age: 44
End: 2024-07-31
Payer: COMMERCIAL

## 2024-07-31 VITALS — BODY MASS INDEX: 25.45 KG/M2 | HEIGHT: 73 IN | WEIGHT: 192 LBS

## 2024-07-31 DIAGNOSIS — M48.062 SPINAL STENOSIS OF LUMBAR REGION WITH NEUROGENIC CLAUDICATION: ICD-10-CM

## 2024-07-31 DIAGNOSIS — M47.817 LUMBOSACRAL SPONDYLOSIS WITHOUT MYELOPATHY: Primary | ICD-10-CM

## 2024-07-31 DIAGNOSIS — M54.2 NECK PAIN: Primary | ICD-10-CM

## 2024-07-31 DIAGNOSIS — M47.812 CERVICAL SPONDYLOSIS WITHOUT MYELOPATHY: ICD-10-CM

## 2024-07-31 DIAGNOSIS — M47.817 LUMBOSACRAL SPONDYLOSIS WITHOUT MYELOPATHY: ICD-10-CM

## 2024-07-31 DIAGNOSIS — M54.12 CERVICAL RADICULOPATHY: Primary | ICD-10-CM

## 2024-07-31 RX ORDER — TOFACITINIB 11 MG/1
11 TABLET, FILM COATED, EXTENDED RELEASE ORAL DAILY
COMMUNITY
Start: 2024-07-20

## 2024-07-31 NOTE — PROGRESS NOTES
Referring Physician: No referring provider defined for this encounter.    Primary Physician: Misa Barragan PA    CHIEF COMPLAINT or REASON FOR VISIT: Follow-up (Review MRI), Back Pain, and Neck Pain      Initial history of present illness on 06/26/2024:  Mr. Amor Avila is 44 y.o. male who presents as a new patient referral for evaluation treatment of chronic neck pain with radiation to bilateral upper extremities and hands.  On his right hand this radiates into his thumb index and long finger, in his left hand this radiates into the pinky and ring fingers.  He has been evaluated in the past for this issue by Dr. Flores, neurosurgery, did not identify any surgical intervention necessary at the time.  He does also have bilateral peripheral neuropathy in his feet.  He has been evaluated by rheumatology in the past, was initially diagnosed with rheumatoid arthritis and given Humira which was not helpful and subsequently had further testing which removed the diagnosis of rheumatoid arthritis.  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  He has never had any injection or intervention.  Patient denies any bowel or bladder dysfunction, lower extremity weakness, new onset saddle anesthesia or unexplained weight loss.   Additionally describes increasing bilateral posterior thigh heaviness and weakness with ambulation and standing.    Interval history:  Patient returns to clinic after undergoing cervical and lumbar MRIs.  He continues to complain of chronic neck pain with radiation to the bilateral upper extremities and hands.  Symptoms are about the same bilaterally.  Additionally, he continues to complain of chronic low back pain with radiation to the bilateral posterior thighs.  This pain is exacerbated with ambulation and standing.  He has never had any neck or back surgery.  He has never had an injection or intervention.  He is interested in strategies to relieve his pain.        Interventions:      Objective Pain Scoring:   BRIEF PAIN INVENTORY:  Total score:   Pain Score    07/31/24 0912   PainSc:   8   PainLoc: Neck      PHQ-2: PHQ-2 Total Score: 4  PHQ-9: PHQ-9: Brief Depression Severity Measure Score: 16  Opioid Risk Tool:         Review of Systems:   ROS negative except as otherwise noted     Past Medical History:   Past Medical History:   Diagnosis Date    Cervical disc disorder     Chronic pain disorder     Extremity pain     Headache     Hypertension     Joint pain     Low back pain     Migraine     Neck pain     Peripheral neuropathy     Prostatitis, chronic 08/14/2019    Rheumatoid arthritis          Past Surgical History:   Past Surgical History:   Procedure Laterality Date    NO PAST SURGERIES      TRIGGER POINT INJECTION           Family History   Family History   Problem Relation Age of Onset    Cancer Mother     Osteoarthritis Father     Hypertension Father     Cancer Maternal Grandmother     Cancer Maternal Grandfather     Cancer Paternal Grandmother          Social History   Social History     Socioeconomic History    Marital status:    Tobacco Use    Smoking status: Never     Passive exposure: Never    Smokeless tobacco: Never    Tobacco comments:     Former smokeless tobacco user   Vaping Use    Vaping status: Never Used   Substance and Sexual Activity    Alcohol use: Yes     Alcohol/week: 2.0 standard drinks of alcohol     Types: 2 Cans of beer per week     Comment: OCC    Drug use: No    Sexual activity: Yes     Partners: Female     Birth control/protection: Vasectomy, OCP        Medications:     Current Outpatient Medications:     acetaminophen-codeine (TYLENOL #4) 300-60 MG per tablet, Take 1 tablet by mouth Every 4 (Four) Hours As Needed for Moderate Pain., Disp: , Rfl:     Asmanex, 60 Metered Doses, 220 MCG/INH inhaler, , Disp: , Rfl:     baclofen (LIORESAL) 10 MG tablet, Take 1 tablet by mouth 3 (Three) Times a Day., Disp: 90 tablet, Rfl: 2     "EPINEPHrine (EPIPEN) 0.3 MG/0.3ML solution auto-injector injection, , Disp: , Rfl:     gabapentin (NEURONTIN) 800 MG tablet, Take 1 tablet by mouth 3 (Three) Times a Day., Disp: , Rfl:     ibuprofen (ADVIL,MOTRIN) 800 MG tablet, Take 1 tablet by mouth Every 6 (Six) Hours As Needed for Mild Pain., Disp: , Rfl:     pantoprazole (PROTONIX) 40 MG EC tablet, , Disp: , Rfl:     tadalafil (Cialis) 5 MG tablet, Take 1 tablet by mouth Daily As Needed for Erectile Dysfunction. Take one Daily, Disp: 30 tablet, Rfl: 6    tamsulosin (Flomax) 0.4 MG capsule 24 hr capsule, Take 1 capsule by mouth Every Night., Disp: 30 capsule, Rfl: 5    Tofacitinib Citrate ER (Xeljanz XR) 11 MG tablet sustained-release 24 hour, Take 1 tablet by mouth Daily., Disp: , Rfl:     traMADol (ULTRAM) 50 MG tablet, Take 1 tablet by mouth Every 8 (Eight) Hours As Needed for Moderate Pain., Disp: , Rfl:         Physical Exam:     Vitals:    07/31/24 0912   Weight: 87.1 kg (192 lb)   Height: 185.4 cm (72.99\")   PainSc:   8   PainLoc: Neck        General: Alert and oriented, No acute distress.   HEENT: Normocephalic, atraumatic.   Cardiovascular: No gross edema  Respiratory: Respirations are non-labored    Cervical Spine:   No masses or atrophy  Range of motion - Flexion normal. Extension normal. Lateral rotation normal.   Palpation - nontender   Spurling's -positive bilaterally    Thoracic Spine:   Inspection: no gross abnormality  Paraspinal muscle palpation: nontender  Spinous process palpation: nontender    Lumbar Spine:   No masses or atrophy  Range of motion - Flexion normal. Extension normal.    Facet Loading: Negative bilaterally  Facet Palpation - Nontender   Jeffrey finger/Gaenslen's/Gopi's/EBONY/Thigh thrust -   Straight leg raise/slump test: Negative bilaterally      Motor Exam:    Strength: Rate on 1-5 scale Right Left    C5-Elbow flexion, Deltoid 5/5  5/5    C6-Wrist extension 5/5  5/5    C7- Elbow / finger extension 5/5  5/5    C8- Finger " flexion 5/5  5/5    T1- Intrinsics hand 5/5  5/5    Strength: Rate on 1-5 scale Right Left    L1/2- hip flexion 5/5  5/5    L3- knee extension 5/5  5/5    L4- ankle dorsiflexion 5/5  5/5    L5- great toe extension 5/5  5/5    S1- ankle plantarflexion 5/5  5/5    Sensory Exam: Altered sensation right C5-6 dermatome, left C8 dermatome  Bilateral shoulder exams:  Guzman Obi negative  Empty can negative  AC cross body test negative  Neer's test negative      Neurologic: Cranial Nerves II-XII are grossly intact.   Bailey’s -negative bilaterally     Psychiatric: Cooperative.   Gait: Normal   Assistive Devices: None    Imaging Studies:   Results for orders placed during the hospital encounter of 12/16/20    MRI Lumbar Spine Without Contrast    Narrative  MRI LUMBAR SPINE WITHOUT CONTRAST-    REASON FOR EXAM:  Low back pain, > 6 wks; G56.03-Carpal tunnel syndrome,  bilateral upper limbs; M51.36-Other intervertebral disc degeneration,  lumbar region    TECHNIQUE: In the sagittal and axial imaging planes T1 and T2 weighted  sequences were acquired through the lumbar spine.  The axial sequences  were acquired through the lumbar disc spaces.    MRI FINDINGS : The lumbar vertebral bodies are normal in height and  alignment. The vertebral bodies show normal marrow signal. The upper  lumbar discs were well maintained in height and show bright signal  intensity. There is loss of disc height and disc desiccation at L4-L5  and L5-S1. In the axial plane at L4-L5, there is mild facet joint  arthritis slightly narrowing the lateral recesses. There is mild  broad-based disc bulging as well. At L5-S1, the spinal canal was  maintained in contour. There is mild facet joint arthritis as well.    Impression  Degenerative disc changes in the lower 2 lumbar disc spaces.  There is osteoarthritis in the facet joints that does mildly narrow the  lateral recesses at L4-L5.    This report was finalized on 12/16/2020 10:32 AM by   Bi Jenkins II, MD.      PROCEDURE: MRI CERVICAL SPINE WO CONTRAST-     HISTORY: Cervical radiculopathy; M54.12-Radiculopathy, cervical region     COMPARISON: None.     TECHNIQUE: Multiplanar multisequence imaging of the cervical spine was  performed without intravenous contrast.     FINDINGS: The cervical vertebral bodies maintain a normal height,  alignment and signal intensity. The posterior elements are intact.     At the C3/4 level there is a broad-based disc bulge, however there is no  significant spinal stenosis or neural foraminal narrowing.     At the C4/5 level there is a broad-based disc bulge and mild left  greater than right facet arthropathy, however there is no significant  spinal stenosis or neural foraminal narrowing.     At the C5/6 level there is a posterior disc osteophyte complex and facet  arthropathy which produces mild bilateral neural foraminal narrowing.  There is no significant spinal stenosis.     At the C6/7 level there is a left paracentral disc protrusion which  produces mild spinal stenosis and moderate left neural foraminal  narrowing.     The cervical portions of the spinal cord maintains a normal caliber and  signal intensity. The visualized posterior fossa is normal. The  paraspinal soft tissues are unremarkable.     IMPRESSION:  Multilevel degenerative changes as detailed above.        This report was finalized on 7/20/2024 11:09 AM by Yannick Quinn M.D..    PROCEDURE: MRI LUMBAR SPINE WO CONTRAST-     HISTORY: Spinal stenosis of lumbar region with neurogenic claudication;  M48.061-Spinal stenosis, lumbar region without neurogenic claudication     COMPARISON: None.     TECHNIQUE: Multiplanar multisequence imaging of the lumbar spine was  performed without intravenous contrast.     FINDINGS: The lumbar vertebral bodies maintain a normal height,  alignment and signal intensity. The posterior elements are intact  throughout.     At the L4/5 level there is a broad-based  disc bulge with an outer  annular tear. This combined with facet arthropathy produces mild spinal  stenosis and mild bilateral neural foraminal narrowing.     At the L5/S1 level there is a broad-based disc bulge and facet  arthropathy without significant spinal stenosis or neural foraminal  narrowing.     The spinal cord terminates at the T12 level. No conus lesions are  present. The paraspinal soft tissues are unremarkable.     IMPRESSION:  Degenerative change in the lower lumbar spine as detailed  above.        This report was finalized on 7/20/2024 10:46 AM by Yannick Quinn M.D..     Independent review of radiographic imaging: Available for my interpretation is a cervical MRI dated October 20, 2020: There is left C3/C4 neuroforaminal stenosis; there is left C5/C6 neuroforaminal stenosis; there is mild bilateral C6/C7 foraminal stenosis; canal is patent.    Cervical MRI dated 7/20/2024 demonstrates: DDD; left C3/C4 NFS; mild bilateral C5/C6 NFS; left C6/C7 disc protrusion resulting in moderate left NFS, mild canal stenosis; multiple levels of facet arthropathy.     Lumbar MRI dated 7/20/2024 demonstrates: DDD worst at L4/5, L5/S1; broad-based disc bulge at L4/5 resulting in mild canal stenosis as well as mild bilateral NFS; moderate annular tear at L4/5; facet arthropathy worst at L4/5 and L5/S1        Impression & Plan:       06/26/2024: Amor Avila is a 44 y.o. male with past medical history significant for peripheral neuropathy who presents to the pain clinic for evaluation and treatment of chronic neck pain with radiation to bilateral upper extremities, chronic bilateral lower extremity heaviness and weakness with ambulation.  Upper extremity symptoms consistent with cervical radiculopathy, lower extremity symptoms consistent with lumbar spinal stenosis neurogenic claudication.  MRI interpretation of old cervical MRI as above.  Will obtain new cervical MRI and lumbar MRI to evaluate for cervical  radiculopathy, lumbar spinal stenosis with neurogenic claudication.  I will also give him referral to physical therapy.  7/31/2024: Cervical and lumbar MRIs reviewed.  Examination most consistent with cervical radiculopathy, cervical spondylosis, lumbosacral spondylosis, lumbar spinal stenosis.  We discussed JEAN and LESI. We discussed epidural steroid injection to improve pain.  If greater than 50% relief for at least 2 to 3 months can consider repeat as needed every 3 to 4 months.  Had a discussion with the patient regarding the risk of the procedure including bleeding, infection, damage to surrounding structures.  We discussed the potential adverse effects of corticosteroid injection including flushing of the face, lipodystrophy, skin discoloration, elevated blood glucose, increased blood pressure.  Risks of frequent steroid administration includes weight gain, hormonal changes, mood changes, osteoporosis.  Can consider CMBB, LMBB, EMG/NCV.     1. Cervical radiculopathy    2. Cervical spondylosis without myelopathy    3. Lumbosacral spondylosis without myelopathy    4. Spinal stenosis of lumbar region with neurogenic claudication          PLAN:  1. Medication Recommendations: Recommend Voltaren topical, NSAIDs, Tylenol.  Can trial turmeric 500 mg twice daily if NSAID contraindicated.    2. Physical Therapy: New referral was given today    3. Psychological: defer    4. Complementary and alternative (CAM) Therapies:     5. Labs/Diagnostic studies: Can consider EMG/NCV    6. Imaging: Cervical and lumbar MRIs reviewed    7. Interventions: Schedule C7/T1 cervical interlaminar epidural steroid injection (15458).  2 weeks later schedule lumbar interlaminar epidural steroid injection (45347).  Will most likely enter at L4/5.   -Can consider CMBB, LMBB.    8. Referrals: Physical therapy    9. Records: n/a    10. Lifestyle goals:    Follow-up 2 months      [facility]  [place] Barron Crisp,  PA-C          Quality Metrics:  Patient screened positive for depression based on a PHQ-9 score of 16 on 7/31/2024. Follow-up recommendations include: PCP managing depression.

## 2024-08-13 ENCOUNTER — OUTSIDE FACILITY SERVICE (OUTPATIENT)
Dept: PAIN MEDICINE | Facility: CLINIC | Age: 44
End: 2024-08-13
Payer: COMMERCIAL

## 2024-08-13 ENCOUNTER — DOCUMENTATION (OUTPATIENT)
Dept: PAIN MEDICINE | Facility: CLINIC | Age: 44
End: 2024-08-13

## 2024-08-13 PROCEDURE — 62321 NJX INTERLAMINAR CRV/THRC: CPT | Performed by: STUDENT IN AN ORGANIZED HEALTH CARE EDUCATION/TRAINING PROGRAM

## 2024-08-13 PROCEDURE — 99152 MOD SED SAME PHYS/QHP 5/>YRS: CPT | Performed by: STUDENT IN AN ORGANIZED HEALTH CARE EDUCATION/TRAINING PROGRAM

## 2024-08-13 NOTE — PROGRESS NOTES
[facility]  [address] [place] [address]      PROCEDURE: Fluoroscopically-guided Cervical Interlaminar Epidural Steroid Injection    PRE-OP DIAGNOSIS: Cervical radiculopathy  POST-OP DIAGNOSIS: Cervical radiculopathy    BLOOD THINNERS (ANTIPLATELETS/ANTICOAGULANTS): Were discussed with the patient and have been managed according to TONIE Guidelines.    CONSENT: Risks, benefits and options were explained to the patient, all questions were answered and written informed consent was obtained.     ANESTHESIA: Moderate sedation was required to maintain comfort, safety, and cooperation during the procedure.  The duration of sedation service was over 10 minutes.  Patient received 1mg IV Versed and 50mcg IV fentanyl.  Independent observation and monitoring was performed by Migdalia Mcdermott.  The patient's level of consciousness and physiologic status was continually monitored with pulse oximetry, EKG from 9:45 to 9:55.  There were no complications or adverse events during sedation.  After the sedation patient was taken to the recovery area.      PROCEDURE NOTE: A pre-procedural time out was performed to confirm the correct patient, procedure, side, and site. The patient was placed prone with pillow under the chest and all pressure points padded. The patient's neck and upper thoracic spine were prepped in standard fashion using Chlorhexidine and draped with sterile towels. The target cervical interspace was identified using fluoroscopy. The overlying skin and subcutaneous tissue was anesthetized with 1% lidocaine. A 20 gauge 10 cm Tuohy needle was advanced using intermittent AP and contralateral oblique fluoroscopy to the targeted level. The ligamentum flavum was engaged. Access to the epidural space was gained using a loss of resistance technique to combination air and normal saline. Needle placement was confirmed with 1 ml of Omnipaque 180 mgI/ml contrast using biplanar  fluoroscopic imaging. An epidurogram was noted without evidence of intravascular or intrathecal spread. After negative aspiration, a mixture containing 2 ml of preservative-free normal saline, Dexamethasone 10 mg steroid for a total volume of 3 ml was injected under direct visualization with fluoroscopy. The Tuohy needle was flushed, removed and a bandage applied.     EBL: None     COMPLICATIONS: None     The patient tolerated the procedure well. Vital signs were stable. Sensory and motor exam was unchanged from baseline. The patient was observed in recovery and was discharged after meeting established criteria.    FOLLOW UP: As scheduled     ADDITIONAL NOTES: []    CODES:  [id number]   [id number]

## 2024-09-12 ENCOUNTER — TELEPHONE (OUTPATIENT)
Dept: PAIN MEDICINE | Facility: CLINIC | Age: 44
End: 2024-09-12

## 2024-09-12 NOTE — TELEPHONE ENCOUNTER
Patient called the office to reschedule 09/12/2024 appointment. I returned patient's call and rescheduled him to 09/19/2024.       Humera/Nicole/ASHER aware.

## 2024-09-19 ENCOUNTER — OUTSIDE FACILITY SERVICE (OUTPATIENT)
Dept: PAIN MEDICINE | Facility: CLINIC | Age: 44
End: 2024-09-19
Payer: COMMERCIAL

## 2024-09-19 ENCOUNTER — DOCUMENTATION (OUTPATIENT)
Dept: PAIN MEDICINE | Facility: CLINIC | Age: 44
End: 2024-09-19

## 2024-09-19 PROCEDURE — 62323 NJX INTERLAMINAR LMBR/SAC: CPT | Performed by: STUDENT IN AN ORGANIZED HEALTH CARE EDUCATION/TRAINING PROGRAM

## 2024-09-19 PROCEDURE — 99152 MOD SED SAME PHYS/QHP 5/>YRS: CPT | Performed by: STUDENT IN AN ORGANIZED HEALTH CARE EDUCATION/TRAINING PROGRAM

## 2024-11-12 ENCOUNTER — OFFICE VISIT (OUTPATIENT)
Dept: PAIN MEDICINE | Facility: CLINIC | Age: 44
End: 2024-11-12
Payer: COMMERCIAL

## 2024-11-12 VITALS — HEIGHT: 73 IN | BODY MASS INDEX: 27.37 KG/M2 | WEIGHT: 206.5 LBS

## 2024-11-12 DIAGNOSIS — M47.817 LUMBOSACRAL SPONDYLOSIS WITHOUT MYELOPATHY: ICD-10-CM

## 2024-11-12 DIAGNOSIS — M54.12 CERVICAL RADICULOPATHY: Primary | ICD-10-CM

## 2024-11-12 DIAGNOSIS — M54.2 NECK PAIN: ICD-10-CM

## 2024-11-12 DIAGNOSIS — M48.062 SPINAL STENOSIS OF LUMBAR REGION WITH NEUROGENIC CLAUDICATION: ICD-10-CM

## 2024-11-12 DIAGNOSIS — M47.812 CERVICAL SPONDYLOSIS WITHOUT MYELOPATHY: ICD-10-CM

## 2024-11-12 RX ORDER — BUDESONIDE AND FORMOTEROL FUMARATE DIHYDRATE 160; 4.5 UG/1; UG/1
AEROSOL RESPIRATORY (INHALATION)
COMMUNITY
Start: 2024-08-06

## 2024-11-12 RX ORDER — ALBUTEROL SULFATE 90 UG/1
INHALANT RESPIRATORY (INHALATION)
COMMUNITY
Start: 2024-08-06

## 2024-11-12 NOTE — PROGRESS NOTES
Referring Physician: No referring provider defined for this encounter.    Primary Physician: Misa Barragan PA    CHIEF COMPLAINT or REASON FOR VISIT: Follow-up (Post JEAN and LESI), Neck Pain, and Back Pain      Initial history of present illness on 06/26/2024:  Mr. Amor Avila is 44 y.o. male who presents as a new patient referral for evaluation treatment of chronic neck pain with radiation to bilateral upper extremities and hands.  On his right hand this radiates into his thumb index and long finger, in his left hand this radiates into the pinky and ring fingers.  He has been evaluated in the past for this issue by Dr. Flores, neurosurgery, did not identify any surgical intervention necessary at the time.  He does also have bilateral peripheral neuropathy in his feet.  He has been evaluated by rheumatology in the past, was initially diagnosed with rheumatoid arthritis and given Humira which was not helpful and subsequently had further testing which removed the diagnosis of rheumatoid arthritis.  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  He has never had any injection or intervention.  Patient denies any bowel or bladder dysfunction, lower extremity weakness, new onset saddle anesthesia or unexplained weight loss.   Additionally describes increasing bilateral posterior thigh heaviness and weakness with ambulation and standing.    Interval history:  Patient returns to clinic after undergoing a cervical and lumbar interlaminar epidural steroid injection.  He reports excellent but transient relief from a cervical epidural steroid injection.  He does report good relief from the lumbar interlaminar epidural injection.  He has noticed significant improvement in his back symptoms and even his back spasms.  He still had some pain within his left hamstring but believes this is a focal issue rather than coming from the back.  He would be interested in potential repeat injections if  needed for his lower back.  Primarily complains of axial neck pain at today's appointment.  There is some degree of radiation to the bilateral shoulders.  There is also focal pain within the bilateral trapezius muscles.  He has tried dry needling in the past which provided good but transient relief.  He uses a TENS unit with some relief.  He is interested in additional strategies to help alleviate his pain.  He denies any new injuries or events since previous appointment.  He reports he has been diagnosed with cubital tunnel in his left arm as well as carpal tunnel in his right arm in the past but is unsure of these diagnoses.    Interventions:  8/13/2024: JEAN with excellent relief for 2 days.  9/19/2024: LESI with 75-80% relief ongoing    Objective Pain Scoring:   BRIEF PAIN INVENTORY:  Total score:   Pain Score    11/12/24 0938   PainSc:   8   PainLoc: Generalized      PHQ-2:    PHQ-9:    Opioid Risk Tool:         Review of Systems:   ROS negative except as otherwise noted     Past Medical History:   Past Medical History:   Diagnosis Date    Cervical disc disorder     Chronic pain disorder     Extremity pain     Headache     Hypertension     Joint pain     Low back pain     Migraine     Neck pain     Peripheral neuropathy     Prostatitis, chronic 08/14/2019    Rheumatoid arthritis          Past Surgical History:   Past Surgical History:   Procedure Laterality Date    NO PAST SURGERIES      TRIGGER POINT INJECTION           Family History   Family History   Problem Relation Age of Onset    Cancer Mother     Osteoarthritis Father     Hypertension Father     Cancer Maternal Grandmother     Cancer Maternal Grandfather     Cancer Paternal Grandmother          Social History   Social History     Socioeconomic History    Marital status:    Tobacco Use    Smoking status: Never     Passive exposure: Never    Smokeless tobacco: Never    Tobacco comments:     Former smokeless tobacco user   Vaping Use    Vaping  "status: Never Used   Substance and Sexual Activity    Alcohol use: Yes     Alcohol/week: 2.0 standard drinks of alcohol     Types: 2 Cans of beer per week     Comment: OCC    Drug use: No    Sexual activity: Yes     Partners: Female     Birth control/protection: Vasectomy, OCP        Medications:     Current Outpatient Medications:     acetaminophen-codeine (TYLENOL #4) 300-60 MG per tablet, Take 1 tablet by mouth Every 4 (Four) Hours As Needed for Moderate Pain., Disp: , Rfl:     albuterol sulfate  (90 Base) MCG/ACT inhaler, , Disp: , Rfl:     Asmanex, 60 Metered Doses, 220 MCG/INH inhaler, , Disp: , Rfl:     baclofen (LIORESAL) 10 MG tablet, Take 1 tablet by mouth 3 (Three) Times a Day., Disp: 90 tablet, Rfl: 2    budesonide-formoterol (SYMBICORT) 160-4.5 MCG/ACT inhaler, , Disp: , Rfl:     EPINEPHrine (EPIPEN) 0.3 MG/0.3ML solution auto-injector injection, , Disp: , Rfl:     gabapentin (NEURONTIN) 800 MG tablet, Take 1 tablet by mouth 3 (Three) Times a Day., Disp: , Rfl:     Humira, 2 Pen, 40 MG/0.4ML Pen-injector Kit, , Disp: , Rfl:     ibuprofen (ADVIL,MOTRIN) 800 MG tablet, Take 1 tablet by mouth Every 6 (Six) Hours As Needed for Mild Pain., Disp: , Rfl:     pantoprazole (PROTONIX) 40 MG EC tablet, , Disp: , Rfl:     tadalafil (Cialis) 5 MG tablet, Take 1 tablet by mouth Daily As Needed for Erectile Dysfunction. Take one Daily, Disp: 30 tablet, Rfl: 6    tamsulosin (Flomax) 0.4 MG capsule 24 hr capsule, Take 1 capsule by mouth Every Night., Disp: 30 capsule, Rfl: 5    Tofacitinib Citrate ER (Xeljanz XR) 11 MG tablet sustained-release 24 hour, Take 1 tablet by mouth Daily., Disp: , Rfl:     traMADol (ULTRAM) 50 MG tablet, Take 1 tablet by mouth Every 8 (Eight) Hours As Needed for Moderate Pain., Disp: , Rfl:         Physical Exam:     Vitals:    11/12/24 0938   Weight: 93.7 kg (206 lb 8 oz)   Height: 185.4 cm (72.99\")   PainSc:   8   PainLoc: Generalized        General: Alert and oriented, No acute " distress.   HEENT: Normocephalic, atraumatic.   Cardiovascular: No gross edema  Respiratory: Respirations are non-labored    Cervical Spine:   No masses or atrophy  Range of motion - Flexion normal. Extension normal. Lateral rotation normal.   Palpation - nontender   Spurling's -positive bilaterally    Thoracic Spine:   Inspection: no gross abnormality  Paraspinal muscle palpation: nontender  Spinous process palpation: nontender    Lumbar Spine:   No masses or atrophy  Range of motion - Flexion normal. Extension normal.    Facet Loading: Negative bilaterally  Facet Palpation - Nontender   Jeffrey finger/Gaenslen's/Gopi's/EBONY/Thigh thrust -   Straight leg raise/slump test: Negative bilaterally      Motor Exam:    Strength: Rate on 1-5 scale Right Left    C5-Elbow flexion, Deltoid 5/5  5/5    C6-Wrist extension 5/5  5/5    C7- Elbow / finger extension 5/5  5/5    C8- Finger flexion 5/5  5/5    T1- Intrinsics hand 5/5  5/5    Strength: Rate on 1-5 scale Right Left    L1/2- hip flexion 5/5  5/5    L3- knee extension 5/5  5/5    L4- ankle dorsiflexion 5/5  5/5    L5- great toe extension 5/5  5/5    S1- ankle plantarflexion 5/5  5/5    Sensory Exam: Altered sensation right C5-6 dermatome, left C8 dermatome  Bilateral shoulder exams:  Guzman Obi negative  Empty can negative  AC cross body test negative  Neer's test negative      Neurologic: Cranial Nerves II-XII are grossly intact.   Bailey’s -negative bilaterally     Psychiatric: Cooperative.   Gait: Normal   Assistive Devices: None    Imaging Studies:   Results for orders placed during the hospital encounter of 12/16/20    MRI Lumbar Spine Without Contrast    Narrative  MRI LUMBAR SPINE WITHOUT CONTRAST-    REASON FOR EXAM:  Low back pain, > 6 wks; G56.03-Carpal tunnel syndrome,  bilateral upper limbs; M51.36-Other intervertebral disc degeneration,  lumbar region    TECHNIQUE: In the sagittal and axial imaging planes T1 and T2 weighted  sequences were  acquired through the lumbar spine.  The axial sequences  were acquired through the lumbar disc spaces.    MRI FINDINGS : The lumbar vertebral bodies are normal in height and  alignment. The vertebral bodies show normal marrow signal. The upper  lumbar discs were well maintained in height and show bright signal  intensity. There is loss of disc height and disc desiccation at L4-L5  and L5-S1. In the axial plane at L4-L5, there is mild facet joint  arthritis slightly narrowing the lateral recesses. There is mild  broad-based disc bulging as well. At L5-S1, the spinal canal was  maintained in contour. There is mild facet joint arthritis as well.    Impression  Degenerative disc changes in the lower 2 lumbar disc spaces.  There is osteoarthritis in the facet joints that does mildly narrow the  lateral recesses at L4-L5.    This report was finalized on 12/16/2020 10:32 AM by Dr. Bi Jenkins II, MD.      PROCEDURE: MRI CERVICAL SPINE WO CONTRAST-     HISTORY: Cervical radiculopathy; M54.12-Radiculopathy, cervical region     COMPARISON: None.     TECHNIQUE: Multiplanar multisequence imaging of the cervical spine was  performed without intravenous contrast.     FINDINGS: The cervical vertebral bodies maintain a normal height,  alignment and signal intensity. The posterior elements are intact.     At the C3/4 level there is a broad-based disc bulge, however there is no  significant spinal stenosis or neural foraminal narrowing.     At the C4/5 level there is a broad-based disc bulge and mild left  greater than right facet arthropathy, however there is no significant  spinal stenosis or neural foraminal narrowing.     At the C5/6 level there is a posterior disc osteophyte complex and facet  arthropathy which produces mild bilateral neural foraminal narrowing.  There is no significant spinal stenosis.     At the C6/7 level there is a left paracentral disc protrusion which  produces mild spinal stenosis and moderate left  neural foraminal  narrowing.     The cervical portions of the spinal cord maintains a normal caliber and  signal intensity. The visualized posterior fossa is normal. The  paraspinal soft tissues are unremarkable.     IMPRESSION:  Multilevel degenerative changes as detailed above.        This report was finalized on 7/20/2024 11:09 AM by Yannick Quinn M.D..    PROCEDURE: MRI LUMBAR SPINE WO CONTRAST-     HISTORY: Spinal stenosis of lumbar region with neurogenic claudication;  M48.061-Spinal stenosis, lumbar region without neurogenic claudication     COMPARISON: None.     TECHNIQUE: Multiplanar multisequence imaging of the lumbar spine was  performed without intravenous contrast.     FINDINGS: The lumbar vertebral bodies maintain a normal height,  alignment and signal intensity. The posterior elements are intact  throughout.     At the L4/5 level there is a broad-based disc bulge with an outer  annular tear. This combined with facet arthropathy produces mild spinal  stenosis and mild bilateral neural foraminal narrowing.     At the L5/S1 level there is a broad-based disc bulge and facet  arthropathy without significant spinal stenosis or neural foraminal  narrowing.     The spinal cord terminates at the T12 level. No conus lesions are  present. The paraspinal soft tissues are unremarkable.     IMPRESSION:  Degenerative change in the lower lumbar spine as detailed  above.        This report was finalized on 7/20/2024 10:46 AM by Yannick Quinn M.D..     Independent review of radiographic imaging: Available for my interpretation is a cervical MRI dated October 20, 2020: There is left C3/C4 neuroforaminal stenosis; there is left C5/C6 neuroforaminal stenosis; there is mild bilateral C6/C7 foraminal stenosis; canal is patent.    Cervical MRI dated 7/20/2024 demonstrates: DDD; left C3/C4 NFS; mild bilateral C5/C6 NFS; left C6/C7 disc protrusion resulting in moderate left NFS, mild canal stenosis; multiple levels  of facet arthropathy.     Lumbar MRI dated 7/20/2024 demonstrates: DDD worst at L4/5, L5/S1; broad-based disc bulge at L4/5 resulting in mild canal stenosis as well as mild bilateral NFS; moderate annular tear at L4/5; facet arthropathy worst at L4/5 and L5/S1        Impression & Plan:       06/26/2024: Amor Avila is a 44 y.o. male with past medical history significant for peripheral neuropathy who presents to the pain clinic for evaluation and treatment of chronic neck pain with radiation to bilateral upper extremities, chronic bilateral lower extremity heaviness and weakness with ambulation.  Upper extremity symptoms consistent with cervical radiculopathy, lower extremity symptoms consistent with lumbar spinal stenosis neurogenic claudication.  MRI interpretation of old cervical MRI as above.  Will obtain new cervical MRI and lumbar MRI to evaluate for cervical radiculopathy, lumbar spinal stenosis with neurogenic claudication.  I will also give him referral to physical therapy.  7/31/2024: Cervical and lumbar MRIs reviewed.  Examination most consistent with cervical radiculopathy, cervical spondylosis, lumbosacral spondylosis, lumbar spinal stenosis.  We discussed JEAN and LESI. We discussed epidural steroid injection to improve pain.  If greater than 50% relief for at least 2 to 3 months can consider repeat as needed every 3 to 4 months.  Had a discussion with the patient regarding the risk of the procedure including bleeding, infection, damage to surrounding structures.  We discussed the potential adverse effects of corticosteroid injection including flushing of the face, lipodystrophy, skin discoloration, elevated blood glucose, increased blood pressure.  Risks of frequent steroid administration includes weight gain, hormonal changes, mood changes, osteoporosis.  Can consider CMBB, LMBB, EMG/NCV.   11/12/2024: Good relief from LESI.  Can consider repeat if needed.  Good but transient relief from JEAN.  Will  proceed with bilateral C3/4/5 MBB/RFA.  Additionally, will obtain EMGs/NCV of BUE to investigate potential cervical radiculopathy versus carpal tunnel syndrome/cubital tunnel syndrome.    1. Cervical radiculopathy    2. Lumbosacral spondylosis without myelopathy    3. Neck pain    4. Cervical spondylosis without myelopathy    5. Spinal stenosis of lumbar region with neurogenic claudication            PLAN:  1. Medication Recommendations: Recommend Voltaren topical, NSAIDs, Tylenol.  Can trial turmeric 500 mg twice daily if NSAID contraindicated.    2. Physical Therapy: Continue PT    3. Psychological: defer    4. Complementary and alternative (CAM) Therapies:     5. Labs/Diagnostic studies: Obtain EMG/NCV of BUE    6. Imaging: Cervical and lumbar MRIs reviewed    7. Interventions: Schedule bilateral C3/4 and C4/5 cervical medial branch blocks (50225, [id number]). If the first block provides diagnostic relief we will schedule a second set of medial branch blocks.  If second set of medial branch blocks provides diagnostic relief we will schedule rhizotomy.   -Risks of this procedure include bleeding, infection, damage surrounding structures which may exacerbate symptoms  -Can consider repeat LESI  -    8. Referrals:     9. Records: n/a    10. Lifestyle goals:    Follow-up 4 months    [facility]  Geeta Benitez PA-C          Quality Metrics:  Patient screened positive for depression based on a PHQ-9 score of 9 on 11/12/2024. Follow-up recommendations include: PCP managing depression.

## 2024-11-25 ENCOUNTER — TELEPHONE (OUTPATIENT)
Dept: PAIN MEDICINE | Facility: CLINIC | Age: 44
End: 2024-11-25

## 2024-11-25 NOTE — TELEPHONE ENCOUNTER
ATTEMPTED TO WARM TRANSFER    Provider:  DR. SHARLA CUNNINGHAM    Caller: NADINE FITZPATRICK    Relationship to Patient: SELF        Phone Number: [phone]    Reason for Call:  PATIENT IS SCHEDULED FOR AN OUT-PATIENT PROCEDURE TOMORROW, 12/26/24 BUT SAYS HE GOT A LETTER FROM HIS INSURANCE COMPANY STATING THEY DENIED IT. PLEASE ADVISE.    When was the patient last seen: 11/12/24

## 2024-11-26 ENCOUNTER — TELEPHONE (OUTPATIENT)
Dept: PAIN MEDICINE | Facility: CLINIC | Age: 44
End: 2024-11-26
Payer: COMMERCIAL

## 2024-11-26 ENCOUNTER — OUTSIDE FACILITY SERVICE (OUTPATIENT)
Dept: PAIN MEDICINE | Facility: CLINIC | Age: 44
End: 2024-11-26
Payer: COMMERCIAL

## 2024-11-26 ENCOUNTER — DOCUMENTATION (OUTPATIENT)
Dept: PAIN MEDICINE | Facility: CLINIC | Age: 44
End: 2024-11-26

## 2024-11-26 NOTE — PROGRESS NOTES
[facility]  [address] [place] [address]    PROCEDURE: Fluoroscopically-guided bilateral C3,4,5 Cervical Medial Branch Nerve Blocks targeting the bilateral C3/4 and C4/5 facet joints     PRE-OP DIAGNOSIS: Cervical spondylosis  POST-OP DIAGNOSIS: Same    CONSENT: Risks, benefits and options were explained to the patient, all questions were answered and written informed consent was obtained.    ANESTHESIA: Local Only     PROCEDURE NOTE:  A pre-procedural time out was performed to confirm the correct patient, procedure, side, and site. The patient was placed in prone position. A sterile field was prepped in standard fashion using Chlorhexidine and draped with sterile towels. The selected medial branch nerves were identified using AP and Lateral fluoroscopic view. The overlying skin and subcutaneous tissue was anesthetized with 1% lidocaine. A 25 gauge 3.5 inch spinal needle was advanced using intermittent fluoroscopy toward the center of the articular pillar at the C3,4,5 levels. Needle placement was confirmed with biplanar fluoroscopic imaging. Following negative aspiration, 0.5 mL of Bupivacaine 0.5% was injected at each location. The needles were re-styletted and withdrawn. The patient's skin was cleaned with alcohol and the injection sites covered with bandages.    EBL: None    COMPLICATIONS: None    The patient was monitored until meeting established discharge criteria. Vital signs remained stable throughout the procedure and in the recovery area. There were no immediate complications and the patient tolerated the procedure well. Sensory and motor exam was unchanged from baseline. The patient received written discharge instructions prior to discharge.    FOLLOW UP: Clinic staff will call to schedule #2 medial branch block    ADDITIONAL NOTES:         [facility]  Aydin Savage MD    Codes:  [id number]  [id number]

## 2024-11-27 NOTE — TELEPHONE ENCOUNTER
FOLLOW-UP CALL AFTER PROCEDURE    I spoke with the patient regarding how he is feeling after his/her procedure with Dr. Savage. Patient reports he/she is doing well.      Amor Avila underwent a bilateral C3,4,5 Cervical Medial Branch Nerve Blocks targeting the bilateral C3/4 and C4/5 facet joints  on 11/26/2024.      Patient reported 15-20% pain relief that lasted for multiple hours.      Today, the patient reports pain has returned to baseline after 24 hours      Patient denies side effects or complications  Patient does not have any questions or concerns at this time

## 2024-12-05 ENCOUNTER — OFFICE VISIT (OUTPATIENT)
Dept: FAMILY MEDICINE CLINIC | Facility: CLINIC | Age: 44
End: 2024-12-05
Payer: COMMERCIAL

## 2024-12-05 VITALS
HEIGHT: 73 IN | SYSTOLIC BLOOD PRESSURE: 110 MMHG | BODY MASS INDEX: 26.77 KG/M2 | OXYGEN SATURATION: 98 % | DIASTOLIC BLOOD PRESSURE: 80 MMHG | WEIGHT: 202 LBS | TEMPERATURE: 97.5 F | HEART RATE: 110 BPM

## 2024-12-05 DIAGNOSIS — D84.9 IMMUNOCOMPROMISED STATE: ICD-10-CM

## 2024-12-05 DIAGNOSIS — R79.89 LOW TESTOSTERONE IN MALE: ICD-10-CM

## 2024-12-05 DIAGNOSIS — R61 DIAPHORESIS: ICD-10-CM

## 2024-12-05 DIAGNOSIS — E78.2 MIXED DYSLIPIDEMIA: ICD-10-CM

## 2024-12-05 DIAGNOSIS — Z12.5 PROSTATE CANCER SCREENING: ICD-10-CM

## 2024-12-05 DIAGNOSIS — E55.9 VITAMIN D DEFICIENCY: ICD-10-CM

## 2024-12-05 DIAGNOSIS — R53.1 GENERALIZED WEAKNESS: Primary | ICD-10-CM

## 2024-12-05 DIAGNOSIS — R53.82 CHRONIC FATIGUE: ICD-10-CM

## 2024-12-05 DIAGNOSIS — Z00.00 HEALTHCARE MAINTENANCE: ICD-10-CM

## 2024-12-05 DIAGNOSIS — R39.89 URINE DISCOLORATION: ICD-10-CM

## 2024-12-05 DIAGNOSIS — J06.9 UPPER RESPIRATORY TRACT INFECTION, UNSPECIFIED TYPE: ICD-10-CM

## 2024-12-05 PROCEDURE — 99214 OFFICE O/P EST MOD 30 MIN: CPT | Performed by: PHYSICIAN ASSISTANT

## 2024-12-05 PROCEDURE — 82550 ASSAY OF CK (CPK): CPT | Performed by: PHYSICIAN ASSISTANT

## 2024-12-05 PROCEDURE — 83036 HEMOGLOBIN GLYCOSYLATED A1C: CPT | Performed by: PHYSICIAN ASSISTANT

## 2024-12-05 PROCEDURE — 82306 VITAMIN D 25 HYDROXY: CPT | Performed by: PHYSICIAN ASSISTANT

## 2024-12-05 PROCEDURE — 82607 VITAMIN B-12: CPT | Performed by: PHYSICIAN ASSISTANT

## 2024-12-05 PROCEDURE — 81001 URINALYSIS AUTO W/SCOPE: CPT | Performed by: PHYSICIAN ASSISTANT

## 2024-12-05 PROCEDURE — 84403 ASSAY OF TOTAL TESTOSTERONE: CPT | Performed by: PHYSICIAN ASSISTANT

## 2024-12-05 PROCEDURE — 80050 GENERAL HEALTH PANEL: CPT | Performed by: PHYSICIAN ASSISTANT

## 2024-12-05 PROCEDURE — 84439 ASSAY OF FREE THYROXINE: CPT | Performed by: PHYSICIAN ASSISTANT

## 2024-12-05 PROCEDURE — 82533 TOTAL CORTISOL: CPT | Performed by: PHYSICIAN ASSISTANT

## 2024-12-05 PROCEDURE — 85652 RBC SED RATE AUTOMATED: CPT | Performed by: PHYSICIAN ASSISTANT

## 2024-12-05 PROCEDURE — 84466 ASSAY OF TRANSFERRIN: CPT | Performed by: PHYSICIAN ASSISTANT

## 2024-12-05 PROCEDURE — 80061 LIPID PANEL: CPT | Performed by: PHYSICIAN ASSISTANT

## 2024-12-05 PROCEDURE — 83540 ASSAY OF IRON: CPT | Performed by: PHYSICIAN ASSISTANT

## 2024-12-05 PROCEDURE — 86140 C-REACTIVE PROTEIN: CPT | Performed by: PHYSICIAN ASSISTANT

## 2024-12-05 NOTE — PROGRESS NOTES
Subjective        Chief Complaint  Extremity Weakness    Subjective      Amor Avila is a 44 y.o. male who presents today to [facility] for Extremity Weakness. He has a past medical history of DDD with chronic neck/back pain, muscle spasms, Hypertension, Prostatitis, and Rheumatoid arthritis.    Extremity Weakness:  Generalized fatigue:  Diaphoresis:  He reports about a 1-2-week history of feeling poorly with generalized fatigue, bilateral lower extremity weakness/heaviness, and feeling like he has had no energy.  Denies any recent medication changes within the last 3 to 4 months.  His most recent medication change was coming off of Humira and starting Xeljanz 3 to 4 months ago.  He has followed up with his rheumatologist at [facility] since then with no concerns noted, but has not had any repeat labs.  He has chronic allergic rhinitis, but does not feel he has had any recent illness.  He feels that his chronic allergic rhinitis causes some persistent hoarseness, however, this has not been evaluated further.  Has remained stable.    He has also noted intermittent episodes of sweating which are new for him.  These occur at random and are not necessarily associated with exertion or changes in temperature.  Has not checked for fever.  No chills or myalgias.  No night sweats.  No known hypoglycemia, but he does not eat regular meals.  He is also not great at staying well-hydrated.  Urine has been somewhat dark.  Denies any dizziness.    He denies any chest pains or shortness of breath.  No family history of premature coronary artery disease. No history of smoking tobacco, he has chewed tobacco in the past. No easy fatigability beyond baseline.  His sleep is chronically decreased due to tossing and turning at night.    DDD of the cervical spine:   DDD of the lumbar spine:  He has a several year history of chronic neck, back, left shoulder pain.  He also has right-sided carpal  tunnel syndrome and left-sided cubital tunnel syndrome.  He is followed with orthopedics in the past without surgical recommendations.  MRI C-spine in 2020 showed C5-C6 disc desiccation and broad-based posterior disc bulging causing mild left neuroforaminal and lateral recess stenosis.  C6-C7 broad-based posterior disc bulging causing left neural foraminal and left lateral recess stenosis.  MRI L-spine in 12/2020 showed degenerative disc changes in the lower 2 lumbar disc spaces.  Osteoarthritis in the facet joints that does mildly narrow the lateral recesses at L4-L5.  He reports having a MRI of the left shoulder within the last year or so with reported stretching of the AC noted. No surgical intervention recommended at that time.  Report not currently available.    Previously had trigger point injections, physical therapy, massage therapy, TENS unit.  Previously tried medical therapy includes Zanaflex, Flexeril, Robaxin, baclofen, meloxicam, Celebrex, duloxetine, Tylenol No. 4, tramadol, gabapentin.  Nothing has ever given lasting relief.  Has had a few months of relief with trigger point injections in the past.    He was referred to pain management at [facility].  Subsequent MRI of the cervical spine showed multilevel degenerative disc disease.  Multiple areas of broad-based disc bulge.     Hypertension:   BP in the office today is 110/80.  Not currently on any medical therapy.    Rheumatoid arthritis:   Following with rheumatology at [facility].  Previously on Humira.  This was discontinued due to suspected lack of efficacy for his disease management and he was transition to Xeljanz about 3 to 4 months ago.     Has been on methotrexate, Plaquenil in the past. Joints affected are mostly his hands and ankles.    Asthma:   Allergies:   Currently managed on Asmanex inhaler, stable.      Current Outpatient Medications:     acetaminophen-codeine (TYLENOL #4) 300-60 MG per tablet, Take 1 tablet by  "mouth Every 4 (Four) Hours As Needed for Moderate Pain., Disp: , Rfl:     albuterol sulfate  (90 Base) MCG/ACT inhaler, , Disp: , Rfl:     Asmanex, 60 Metered Doses, 220 MCG/INH inhaler, , Disp: , Rfl:     budesonide-formoterol (SYMBICORT) 160-4.5 MCG/ACT inhaler, , Disp: , Rfl:     EPINEPHrine (EPIPEN) 0.3 MG/0.3ML solution auto-injector injection, , Disp: , Rfl:     gabapentin (NEURONTIN) 800 MG tablet, Take 1 tablet by mouth 3 (Three) Times a Day., Disp: , Rfl:     ibuprofen (ADVIL,MOTRIN) 800 MG tablet, Take 1 tablet by mouth Every 6 (Six) Hours As Needed for Mild Pain., Disp: , Rfl:     pantoprazole (PROTONIX) 40 MG EC tablet, , Disp: , Rfl:     tadalafil (Cialis) 5 MG tablet, Take 1 tablet by mouth Daily As Needed for Erectile Dysfunction. Take one Daily, Disp: 30 tablet, Rfl: 6    tamsulosin (Flomax) 0.4 MG capsule 24 hr capsule, Take 1 capsule by mouth Every Night., Disp: 30 capsule, Rfl: 5    Tofacitinib Citrate ER (Xeljanz XR) 11 MG tablet sustained-release 24 hour, Take 1 tablet by mouth Daily., Disp: , Rfl:     traMADol (ULTRAM) 50 MG tablet, Take 1 tablet by mouth Every 8 (Eight) Hours As Needed for Moderate Pain., Disp: , Rfl:       No Known Allergies    Objective     Objective   Vital Signs:  /80   Pulse 110   Temp 97.5 °F (36.4 °C) (Oral)   Ht 185.4 cm (72.99\")   Wt 91.6 kg (202 lb)   SpO2 98%   BMI 26.66 kg/m²   Estimated body mass index is 26.66 kg/m² as calculated from the following:    Height as of this encounter: 185.4 cm (72.99\").    Weight as of this encounter: 91.6 kg (202 lb).    BMI is >= 25 and <30. (Overweight) The following options were offered after discussion;: weight loss educational material (shared in after visit summary)    Past Medical History:   Diagnosis Date    Cervical disc disorder     Chronic pain disorder     Extremity pain     Headache     Hypertension     Joint pain     Low back pain     Migraine     Neck pain     Peripheral neuropathy     Prostatitis, " chronic 08/14/2019    Rheumatoid arthritis      Past Surgical History:   Procedure Laterality Date    NO PAST SURGERIES      TRIGGER POINT INJECTION       Social History     Socioeconomic History    Marital status:    Tobacco Use    Smoking status: Never     Passive exposure: Never    Smokeless tobacco: Never    Tobacco comments:     Former smokeless tobacco user   Vaping Use    Vaping status: Never Used   Substance and Sexual Activity    Alcohol use: Yes     Alcohol/week: 2.0 standard drinks of alcohol     Types: 2 Cans of beer per week     Comment: OCC    Drug use: No    Sexual activity: Yes     Partners: Female     Birth control/protection: Vasectomy, OCP      Physical Exam  Vitals and nursing note reviewed.   Constitutional:       General: He is not in acute distress.     Appearance: He is well-developed. He is not diaphoretic.   HENT:      Head: Normocephalic and atraumatic.      Right Ear: Tympanic membrane and ear canal normal.      Left Ear: Tympanic membrane and ear canal normal.      Mouth/Throat:      Pharynx: No oropharyngeal exudate or posterior oropharyngeal erythema.   Eyes:      General: No scleral icterus.        Right eye: No discharge.         Left eye: No discharge.      Conjunctiva/sclera: Conjunctivae normal.   Neck:      Thyroid: No thyroid mass, thyromegaly or thyroid tenderness.      Vascular: No carotid bruit.      Trachea: Trachea normal.   Cardiovascular:      Rate and Rhythm: Normal rate and regular rhythm.      Heart sounds: Normal heart sounds. No murmur heard.     No friction rub. No gallop.   Pulmonary:      Effort: Pulmonary effort is normal. No respiratory distress.      Breath sounds: Normal breath sounds. No wheezing or rales.   Chest:      Chest wall: No tenderness.   Musculoskeletal:         General: No tenderness. Normal range of motion.      Cervical back: Normal range of motion and neck supple. No rigidity or tenderness.   Lymphadenopathy:      Cervical: No cervical  adenopathy.      Upper Body:      Right upper body: No supraclavicular adenopathy.      Left upper body: No supraclavicular adenopathy.   Skin:     General: Skin is warm and dry.      Coloration: Skin is not pale.      Findings: No erythema or rash.   Neurological:      General: No focal deficit present.      Mental Status: He is alert and oriented to person, place, and time.   Psychiatric:         Mood and Affect: Mood normal.         Behavior: Behavior normal.        Result Review :  The following data was reviewed by: LESLIE Bragg on 06/05/2024:  Hemoglobin A1C   Date Value Ref Range Status   12/05/2024 5.30 4.80 - 5.60 % Final     TSH   Date Value Ref Range Status   12/05/2024 0.832 0.270 - 4.200 uIU/mL Final     HDL Cholesterol   Date Value Ref Range Status   12/10/2024 66 (H) 40 - 60 mg/dL Final     LDL Cholesterol    Date Value Ref Range Status   12/10/2024 171 (H) 0 - 100 mg/dL Final     Triglycerides   Date Value Ref Range Status   12/10/2024 117 0 - 150 mg/dL Final       ECG 12 Lead    Date/Time: 12/10/2024 5:16 PM  Performed by: Misa Barragan PA    Authorized by: Misa Barragan PA  Previous ECG: no previous ECG available  Rhythm: sinus rhythm  Rate: normal  BPM: 97  QRS axis: normal    Clinical impression: normal ECG  Comments: Sinus rhythm at 97 bpm.  There is some baseline artifact.  Otherwise unremarkable. QTc 381ms.         Assessment / Plan         Assessment   Diagnoses and all orders for this visit:    1. Generalized weakness  2. Chronic fatigue  3. Diaphoresis  4. Urine discoloration  Unclear etiology at this time.  EKG obtained in the office today shows no acute findings concerning for ischemia or infarct.  He also denies any chest pains or family history of premature coronary artery disease.  He is agreeable to obtaining laboratory studies, listed below.  Will evaluate for electrolyte abnormalities, thyroid dysfunction, endocrine abnormalities, and for signs of  infection given his immunocompromise state with chronic therapy on Xeljanz.   He is to contact the office with any new or worsening symptoms.  - Vitamin D 25 hydroxy  - Vitamin B12  - T4, free  - Iron Profile  - Testosterone  - Cortisol  - C-reactive protein  - Sedimentation Rate  - CK  - CBC w AUTO Differential; Future  - Urinalysis With Microscopic - Urine, Clean Catch; Future    5. Healthcare maintenance  6. Vitamin D deficiency  Agreeable for updated laboratory studies.  Consider colon cancer screening after his 45th birthday here in a few months.  - CBC & Differential  - Comprehensive Metabolic Panel  - Lipid Panel  - TSH  - Hemoglobin A1c  - Vitamin D 25 hydroxy    7. Neck pain, chronic  8. Chronic midline low back pain without sciatica  9. DDD (degenerative disc disease), cervical  10. Lumbar degenerative disc disease  Continue regular follow-up with pain management at [facility].    11. Rheumatoid arthritis involving multiple sites, unspecified whether rheumatoid factor present  On Xeljanz x 3 to 4 months.  Has followed up with rheumatology since it was started with no concerns noted.  Continue follow up with [facility].        No orders of the defined types were placed in this encounter.    Health Maintenance:  Had colonoscopy several years ago with 5 yr repeat recommended 2/2 polyps. He did have the repeat with no concerning findings. 10 yr repeat then recommended.   Consider updating Tdap.     Follow Up   Return in about 2 weeks (around 12/19/2024) for Recheck weakness/fatigue. .    Patient was given instructions and counseling regarding his condition or for health maintenance advice. Please see specific information pulled into the AVS if appropriate.       This document has been electronically signed by LESLIE Bragg   December 10, 2024 17:20 EST    Dictated Utilizing Dragon Dictation: Part of this note may be an electronic transcription/translation of spoken language to printed text  using the Dragon Dictation System.

## 2024-12-06 LAB
25(OH)D3 SERPL-MCNC: 38.7 NG/ML (ref 30–100)
ALBUMIN SERPL-MCNC: 3.9 G/DL (ref 3.5–5.2)
ALBUMIN/GLOB SERPL: 1.2 G/DL
ALP SERPL-CCNC: 73 U/L (ref 39–117)
ALT SERPL W P-5'-P-CCNC: 24 U/L (ref 1–41)
ANION GAP SERPL CALCULATED.3IONS-SCNC: 11.5 MMOL/L (ref 5–15)
AST SERPL-CCNC: 19 U/L (ref 1–40)
BACTERIA UR QL AUTO: NORMAL /HPF
BASOPHILS # BLD AUTO: 0.04 10*3/MM3 (ref 0–0.2)
BASOPHILS NFR BLD AUTO: 0.4 % (ref 0–1.5)
BILIRUB SERPL-MCNC: 0.3 MG/DL (ref 0–1.2)
BILIRUB UR QL STRIP: NEGATIVE
BUN SERPL-MCNC: 12 MG/DL (ref 6–20)
BUN/CREAT SERPL: 10.1 (ref 7–25)
CALCIUM SPEC-SCNC: 9.2 MG/DL (ref 8.6–10.5)
CHLORIDE SERPL-SCNC: 98 MMOL/L (ref 98–107)
CHOLEST SERPL-MCNC: 301 MG/DL (ref 0–200)
CK SERPL-CCNC: 91 U/L (ref 20–200)
CLARITY UR: CLEAR
CO2 SERPL-SCNC: 26.5 MMOL/L (ref 22–29)
COLOR UR: YELLOW
CORTIS SERPL-MCNC: 2.72 MCG/DL
CREAT SERPL-MCNC: 1.19 MG/DL (ref 0.76–1.27)
CRP SERPL-MCNC: <0.3 MG/DL (ref 0–0.5)
DEPRECATED RDW RBC AUTO: 44.3 FL (ref 37–54)
EGFRCR SERPLBLD CKD-EPI 2021: 77.2 ML/MIN/1.73
EOSINOPHIL # BLD AUTO: 0.05 10*3/MM3 (ref 0–0.4)
EOSINOPHIL NFR BLD AUTO: 0.5 % (ref 0.3–6.2)
ERYTHROCYTE [DISTWIDTH] IN BLOOD BY AUTOMATED COUNT: 13.1 % (ref 12.3–15.4)
ERYTHROCYTE [SEDIMENTATION RATE] IN BLOOD: 10 MM/HR (ref 0–15)
GLOBULIN UR ELPH-MCNC: 3.3 GM/DL
GLUCOSE SERPL-MCNC: 83 MG/DL (ref 65–99)
GLUCOSE UR STRIP-MCNC: NEGATIVE MG/DL
HBA1C MFR BLD: 5.3 % (ref 4.8–5.6)
HCT VFR BLD AUTO: 47.6 % (ref 37.5–51)
HDLC SERPL-MCNC: 69 MG/DL (ref 40–60)
HGB BLD-MCNC: 16.1 G/DL (ref 13–17.7)
HGB UR QL STRIP.AUTO: NEGATIVE
HYALINE CASTS UR QL AUTO: NORMAL /LPF
IMM GRANULOCYTES # BLD AUTO: 0.26 10*3/MM3 (ref 0–0.05)
IMM GRANULOCYTES NFR BLD AUTO: 2.4 % (ref 0–0.5)
IRON 24H UR-MRATE: 148 MCG/DL (ref 59–158)
IRON SATN MFR SERPL: 35 % (ref 20–50)
KETONES UR QL STRIP: NEGATIVE
LDLC SERPL CALC-MCNC: 186 MG/DL (ref 0–100)
LDLC/HDLC SERPL: 2.65 {RATIO}
LEUKOCYTE ESTERASE UR QL STRIP.AUTO: NEGATIVE
LYMPHOCYTES # BLD AUTO: 1.15 10*3/MM3 (ref 0.7–3.1)
LYMPHOCYTES NFR BLD AUTO: 10.7 % (ref 19.6–45.3)
MCH RBC QN AUTO: 31.4 PG (ref 26.6–33)
MCHC RBC AUTO-ENTMCNC: 33.8 G/DL (ref 31.5–35.7)
MCV RBC AUTO: 93 FL (ref 79–97)
MONOCYTES # BLD AUTO: 0.32 10*3/MM3 (ref 0.1–0.9)
MONOCYTES NFR BLD AUTO: 3 % (ref 5–12)
NEUTROPHILS NFR BLD AUTO: 8.89 10*3/MM3 (ref 1.7–7)
NEUTROPHILS NFR BLD AUTO: 83 % (ref 42.7–76)
NITRITE UR QL STRIP: NEGATIVE
NRBC BLD AUTO-RTO: 0 /100 WBC (ref 0–0.2)
PH UR STRIP.AUTO: 6 [PH] (ref 5–8)
PLATELET # BLD AUTO: 344 10*3/MM3 (ref 140–450)
PMV BLD AUTO: 9.6 FL (ref 6–12)
POTASSIUM SERPL-SCNC: 4.2 MMOL/L (ref 3.5–5.2)
PROT SERPL-MCNC: 7.2 G/DL (ref 6–8.5)
PROT UR QL STRIP: NEGATIVE
RBC # BLD AUTO: 5.12 10*6/MM3 (ref 4.14–5.8)
RBC # UR STRIP: NORMAL /HPF
REF LAB TEST METHOD: NORMAL
SODIUM SERPL-SCNC: 136 MMOL/L (ref 136–145)
SP GR UR STRIP: 1.02 (ref 1–1.03)
SQUAMOUS #/AREA URNS HPF: NORMAL /HPF
T4 FREE SERPL-MCNC: 1 NG/DL (ref 0.92–1.68)
TESTOST SERPL-MCNC: 120 NG/DL (ref 249–836)
TIBC SERPL-MCNC: 419 MCG/DL (ref 298–536)
TRANSFERRIN SERPL-MCNC: 281 MG/DL (ref 200–360)
TRIGL SERPL-MCNC: 245 MG/DL (ref 0–150)
TSH SERPL DL<=0.05 MIU/L-ACNC: 0.83 UIU/ML (ref 0.27–4.2)
UROBILINOGEN UR QL STRIP: NORMAL
VIT B12 BLD-MCNC: 600 PG/ML (ref 211–946)
VLDLC SERPL-MCNC: 46 MG/DL (ref 5–40)
WBC # UR STRIP: NORMAL /HPF
WBC NRBC COR # BLD AUTO: 10.71 10*3/MM3 (ref 3.4–10.8)

## 2024-12-10 ENCOUNTER — LAB (OUTPATIENT)
Dept: FAMILY MEDICINE CLINIC | Facility: CLINIC | Age: 44
End: 2024-12-10
Payer: COMMERCIAL

## 2024-12-10 DIAGNOSIS — R53.82 CHRONIC FATIGUE: ICD-10-CM

## 2024-12-10 DIAGNOSIS — R53.1 GENERALIZED WEAKNESS: ICD-10-CM

## 2024-12-10 DIAGNOSIS — R79.89 LOW TESTOSTERONE IN MALE: ICD-10-CM

## 2024-12-10 DIAGNOSIS — E78.2 MIXED DYSLIPIDEMIA: ICD-10-CM

## 2024-12-10 DIAGNOSIS — Z12.5 PROSTATE CANCER SCREENING: ICD-10-CM

## 2024-12-10 LAB
BASOPHILS # BLD AUTO: 0.04 10*3/MM3 (ref 0–0.2)
BASOPHILS NFR BLD AUTO: 0.4 % (ref 0–1.5)
CHOLEST SERPL-MCNC: 258 MG/DL (ref 0–200)
CORTIS AM PEAK SERPL-MCNC: 0.46 MCG/DL
DEPRECATED RDW RBC AUTO: 45.3 FL (ref 37–54)
EOSINOPHIL # BLD AUTO: 0.14 10*3/MM3 (ref 0–0.4)
EOSINOPHIL NFR BLD AUTO: 1.2 % (ref 0.3–6.2)
ERYTHROCYTE [DISTWIDTH] IN BLOOD BY AUTOMATED COUNT: 13.7 % (ref 12.3–15.4)
FSH SERPL-ACNC: 4.7 MIU/ML
HCT VFR BLD AUTO: 41.9 % (ref 37.5–51)
HDLC SERPL-MCNC: 66 MG/DL (ref 40–60)
HGB BLD-MCNC: 14.2 G/DL (ref 13–17.7)
IMM GRANULOCYTES # BLD AUTO: 0.1 10*3/MM3 (ref 0–0.05)
IMM GRANULOCYTES NFR BLD AUTO: 0.9 % (ref 0–0.5)
LDLC SERPL CALC-MCNC: 171 MG/DL (ref 0–100)
LDLC/HDLC SERPL: 2.55 {RATIO}
LH SERPL-ACNC: 2.13 MIU/ML
LYMPHOCYTES # BLD AUTO: 2.87 10*3/MM3 (ref 0.7–3.1)
LYMPHOCYTES NFR BLD AUTO: 25.2 % (ref 19.6–45.3)
MCH RBC QN AUTO: 31.2 PG (ref 26.6–33)
MCHC RBC AUTO-ENTMCNC: 33.9 G/DL (ref 31.5–35.7)
MCV RBC AUTO: 92.1 FL (ref 79–97)
MONOCYTES # BLD AUTO: 0.72 10*3/MM3 (ref 0.1–0.9)
MONOCYTES NFR BLD AUTO: 6.3 % (ref 5–12)
NEUTROPHILS NFR BLD AUTO: 66 % (ref 42.7–76)
NEUTROPHILS NFR BLD AUTO: 7.52 10*3/MM3 (ref 1.7–7)
NRBC BLD AUTO-RTO: 0 /100 WBC (ref 0–0.2)
PLATELET # BLD AUTO: 266 10*3/MM3 (ref 140–450)
PMV BLD AUTO: 9.1 FL (ref 6–12)
PROLACTIN SERPL-MCNC: 16.6 NG/ML (ref 4.04–15.2)
PSA SERPL-MCNC: 0.24 NG/ML (ref 0–4)
RBC # BLD AUTO: 4.55 10*6/MM3 (ref 4.14–5.8)
TESTOST SERPL-MCNC: 141 NG/DL (ref 249–836)
TRIGL SERPL-MCNC: 117 MG/DL (ref 0–150)
VLDLC SERPL-MCNC: 21 MG/DL (ref 5–40)
WBC NRBC COR # BLD AUTO: 11.39 10*3/MM3 (ref 3.4–10.8)

## 2024-12-10 PROCEDURE — 85025 COMPLETE CBC W/AUTO DIFF WBC: CPT | Performed by: PHYSICIAN ASSISTANT

## 2024-12-10 PROCEDURE — 83001 ASSAY OF GONADOTROPIN (FSH): CPT | Performed by: PHYSICIAN ASSISTANT

## 2024-12-10 PROCEDURE — G0103 PSA SCREENING: HCPCS | Performed by: PHYSICIAN ASSISTANT

## 2024-12-10 PROCEDURE — 83002 ASSAY OF GONADOTROPIN (LH): CPT | Performed by: PHYSICIAN ASSISTANT

## 2024-12-10 PROCEDURE — 36415 COLL VENOUS BLD VENIPUNCTURE: CPT

## 2024-12-10 PROCEDURE — 84146 ASSAY OF PROLACTIN: CPT | Performed by: PHYSICIAN ASSISTANT

## 2024-12-10 PROCEDURE — 82533 TOTAL CORTISOL: CPT | Performed by: PHYSICIAN ASSISTANT

## 2024-12-10 PROCEDURE — 80061 LIPID PANEL: CPT | Performed by: PHYSICIAN ASSISTANT

## 2024-12-10 PROCEDURE — 84403 ASSAY OF TOTAL TESTOSTERONE: CPT | Performed by: PHYSICIAN ASSISTANT

## 2024-12-10 PROCEDURE — 93000 ELECTROCARDIOGRAM COMPLETE: CPT | Performed by: PHYSICIAN ASSISTANT

## 2024-12-11 ENCOUNTER — HOSPITAL ENCOUNTER (OUTPATIENT)
Dept: MRI IMAGING | Facility: HOSPITAL | Age: 44
Discharge: HOME | End: 2024-12-11
Admitting: PHYSICIAN ASSISTANT
Payer: COMMERCIAL

## 2024-12-11 ENCOUNTER — TELEPHONE (OUTPATIENT)
Dept: FAMILY MEDICINE CLINIC | Facility: CLINIC | Age: 44
End: 2024-12-11
Payer: COMMERCIAL

## 2024-12-11 DIAGNOSIS — D72.829 LEUKOCYTOSIS, UNSPECIFIED TYPE: Primary | ICD-10-CM

## 2024-12-11 DIAGNOSIS — E22.1 HYPERPROLACTINEMIA: ICD-10-CM

## 2024-12-11 DIAGNOSIS — E23.0 CENTRAL HYPOGONADISM: Primary | ICD-10-CM

## 2024-12-11 DIAGNOSIS — E23.0 CENTRAL HYPOGONADISM: ICD-10-CM

## 2024-12-11 DIAGNOSIS — R79.89 LOW SERUM CORTISOL LEVEL: ICD-10-CM

## 2024-12-11 PROCEDURE — A9577 INJ MULTIHANCE: HCPCS | Performed by: PHYSICIAN ASSISTANT

## 2024-12-11 PROCEDURE — 70553 MRI BRAIN STEM W/O & W/DYE: CPT

## 2024-12-11 PROCEDURE — 25510000002 GADOBENATE DIMEGLUMINE 529 MG/ML SOLUTION: Performed by: PHYSICIAN ASSISTANT

## 2024-12-11 RX ADMIN — GADOBENATE DIMEGLUMINE 18 ML: 529 INJECTION, SOLUTION INTRAVENOUS at 14:25

## 2024-12-12 ENCOUNTER — LAB (OUTPATIENT)
Dept: FAMILY MEDICINE CLINIC | Facility: CLINIC | Age: 44
End: 2024-12-12
Payer: COMMERCIAL

## 2024-12-12 DIAGNOSIS — J06.9 UPPER RESPIRATORY TRACT INFECTION, UNSPECIFIED TYPE: ICD-10-CM

## 2024-12-12 DIAGNOSIS — E22.1 HYPERPROLACTINEMIA: ICD-10-CM

## 2024-12-12 DIAGNOSIS — R79.89 LOW SERUM CORTISOL LEVEL: ICD-10-CM

## 2024-12-12 DIAGNOSIS — E23.0 CENTRAL HYPOGONADISM: ICD-10-CM

## 2024-12-12 DIAGNOSIS — D84.9 IMMUNOCOMPROMISED STATE: ICD-10-CM

## 2024-12-12 DIAGNOSIS — D72.829 LEUKOCYTOSIS, UNSPECIFIED TYPE: ICD-10-CM

## 2024-12-12 LAB
B PARAPERT DNA SPEC QL NAA+PROBE: NOT DETECTED
B PERT DNA SPEC QL NAA+PROBE: NOT DETECTED
BASOPHILS # BLD AUTO: 0.05 10*3/MM3 (ref 0–0.2)
BASOPHILS NFR BLD AUTO: 0.4 % (ref 0–1.5)
C PNEUM DNA NPH QL NAA+NON-PROBE: NOT DETECTED
CRP SERPL-MCNC: <0.3 MG/DL (ref 0–0.5)
DEPRECATED RDW RBC AUTO: 45.3 FL (ref 37–54)
EOSINOPHIL # BLD AUTO: 0.11 10*3/MM3 (ref 0–0.4)
EOSINOPHIL NFR BLD AUTO: 0.9 % (ref 0.3–6.2)
ERYTHROCYTE [DISTWIDTH] IN BLOOD BY AUTOMATED COUNT: 13.7 % (ref 12.3–15.4)
ERYTHROCYTE [SEDIMENTATION RATE] IN BLOOD: 3 MM/HR (ref 0–15)
FLUAV SUBTYP SPEC NAA+PROBE: NOT DETECTED
FLUBV RNA ISLT QL NAA+PROBE: NOT DETECTED
HADV DNA SPEC NAA+PROBE: NOT DETECTED
HCOV 229E RNA SPEC QL NAA+PROBE: NOT DETECTED
HCOV HKU1 RNA SPEC QL NAA+PROBE: NOT DETECTED
HCOV NL63 RNA SPEC QL NAA+PROBE: NOT DETECTED
HCOV OC43 RNA SPEC QL NAA+PROBE: NOT DETECTED
HCT VFR BLD AUTO: 43.9 % (ref 37.5–51)
HGB BLD-MCNC: 15.2 G/DL (ref 13–17.7)
HMPV RNA NPH QL NAA+NON-PROBE: NOT DETECTED
HPIV1 RNA ISLT QL NAA+PROBE: NOT DETECTED
HPIV2 RNA SPEC QL NAA+PROBE: NOT DETECTED
HPIV3 RNA NPH QL NAA+PROBE: NOT DETECTED
HPIV4 P GENE NPH QL NAA+PROBE: NOT DETECTED
IMM GRANULOCYTES # BLD AUTO: 0.14 10*3/MM3 (ref 0–0.05)
IMM GRANULOCYTES NFR BLD AUTO: 1.2 % (ref 0–0.5)
LYMPHOCYTES # BLD AUTO: 3.53 10*3/MM3 (ref 0.7–3.1)
LYMPHOCYTES NFR BLD AUTO: 30 % (ref 19.6–45.3)
M PNEUMO IGG SER IA-ACNC: NOT DETECTED
MCH RBC QN AUTO: 31.8 PG (ref 26.6–33)
MCHC RBC AUTO-ENTMCNC: 34.6 G/DL (ref 31.5–35.7)
MCV RBC AUTO: 91.8 FL (ref 79–97)
MONOCYTES # BLD AUTO: 0.8 10*3/MM3 (ref 0.1–0.9)
MONOCYTES NFR BLD AUTO: 6.8 % (ref 5–12)
NEUTROPHILS NFR BLD AUTO: 60.7 % (ref 42.7–76)
NEUTROPHILS NFR BLD AUTO: 7.13 10*3/MM3 (ref 1.7–7)
NRBC BLD AUTO-RTO: 0 /100 WBC (ref 0–0.2)
PLATELET # BLD AUTO: 275 10*3/MM3 (ref 140–450)
PMV BLD AUTO: 9.2 FL (ref 6–12)
RBC # BLD AUTO: 4.78 10*6/MM3 (ref 4.14–5.8)
RHINOVIRUS RNA SPEC NAA+PROBE: NOT DETECTED
RSV RNA NPH QL NAA+NON-PROBE: NOT DETECTED
SARS-COV-2 RNA RESP QL NAA+PROBE: NOT DETECTED
WBC NRBC COR # BLD AUTO: 11.76 10*3/MM3 (ref 3.4–10.8)

## 2024-12-12 PROCEDURE — 82024 ASSAY OF ACTH: CPT | Performed by: PHYSICIAN ASSISTANT

## 2024-12-12 PROCEDURE — 85025 COMPLETE CBC W/AUTO DIFF WBC: CPT | Performed by: PHYSICIAN ASSISTANT

## 2024-12-12 PROCEDURE — 85652 RBC SED RATE AUTOMATED: CPT | Performed by: PHYSICIAN ASSISTANT

## 2024-12-12 PROCEDURE — 86140 C-REACTIVE PROTEIN: CPT | Performed by: PHYSICIAN ASSISTANT

## 2024-12-12 PROCEDURE — 36415 COLL VENOUS BLD VENIPUNCTURE: CPT

## 2024-12-12 PROCEDURE — 0202U NFCT DS 22 TRGT SARS-COV-2: CPT | Performed by: PHYSICIAN ASSISTANT

## 2024-12-13 ENCOUNTER — HOSPITAL ENCOUNTER (OUTPATIENT)
Dept: GENERAL RADIOLOGY | Facility: HOSPITAL | Age: 44
Discharge: HOME | End: 2024-12-13
Admitting: PHYSICIAN ASSISTANT
Payer: COMMERCIAL

## 2024-12-13 DIAGNOSIS — R61 DIAPHORESIS: ICD-10-CM

## 2024-12-13 DIAGNOSIS — J06.9 UPPER RESPIRATORY TRACT INFECTION, UNSPECIFIED TYPE: ICD-10-CM

## 2024-12-13 DIAGNOSIS — R53.82 CHRONIC FATIGUE: ICD-10-CM

## 2024-12-13 DIAGNOSIS — R53.1 GENERALIZED WEAKNESS: ICD-10-CM

## 2024-12-13 DIAGNOSIS — D84.9 IMMUNOCOMPROMISED STATE: ICD-10-CM

## 2024-12-13 LAB — ACTH PLAS-MCNC: 2.2 PG/ML (ref 7.2–63.3)

## 2024-12-13 PROCEDURE — 71046 X-RAY EXAM CHEST 2 VIEWS: CPT

## 2024-12-18 ENCOUNTER — OFFICE VISIT (OUTPATIENT)
Age: 44
End: 2024-12-18
Payer: COMMERCIAL

## 2024-12-18 VITALS
DIASTOLIC BLOOD PRESSURE: 74 MMHG | BODY MASS INDEX: 27.04 KG/M2 | HEIGHT: 73 IN | WEIGHT: 204 LBS | HEART RATE: 79 BPM | OXYGEN SATURATION: 98 % | SYSTOLIC BLOOD PRESSURE: 126 MMHG

## 2024-12-18 DIAGNOSIS — E23.0 HYPOGONADOTROPIC HYPOGONADISM: ICD-10-CM

## 2024-12-18 DIAGNOSIS — E27.49 SECONDARY ADRENAL INSUFFICIENCY: Primary | ICD-10-CM

## 2024-12-18 PROCEDURE — 99204 OFFICE O/P NEW MOD 45 MIN: CPT | Performed by: INTERNAL MEDICINE

## 2024-12-18 NOTE — ASSESSMENT & PLAN NOTE
The most likely cause was the dose of  exogenous steroids in August. Even one dose can cause this.   The plan is to do a irina stim before we commit him to lifelong steroids

## 2024-12-18 NOTE — ASSESSMENT & PLAN NOTE
Once we do the irina stim, he will need testo replacement. I prefer xyosted if we can get it covered so we will try that first.

## 2024-12-18 NOTE — PROGRESS NOTES
"     Office Note      Date: 2024  Patient Name: Amor Avila  MRN: [id number]  : 1980    Chief Complaint   Patient presents with    Hypogonadism    Hyperprolactinemia    Low serum cortisol level       History of Present Illness:   Amor Avila is a 44 y.o. male who presents for Hypogonadism, Hyperprolactinemia, and Low serum cortisol level  .   Patient is seen for a new patient evaluation    He has been seeing his pcp about general malaise. Labs showed very low cortisol, low acth, low testo, normal fsh and lh, /tft's are nromal  Mri showed normal pituitary    Subjective          Review of Systems:   Review of Systems   Constitutional:  Positive for fatigue and unexpected weight change.   Neurological:  Positive for weakness.       The following portions of the patient's history were reviewed and updated as appropriate: allergies, current medications, past family history, past medical history, past social history, past surgical history, and problem list.    Objective     Visit Vitals  /74 (BP Location: Left arm, Patient Position: Sitting, Cuff Size: Adult)   Pulse 79   Ht 185.4 cm (73\")   Wt 92.5 kg (204 lb)   SpO2 98%   BMI 26.91 kg/m²           Physical Exam:  Physical Exam  Vitals reviewed.   Constitutional:       Appearance: He is normal weight.   HENT:      Head: Normocephalic and atraumatic.   Cardiovascular:      Rate and Rhythm: Normal rate.   Pulmonary:      Effort: Pulmonary effort is normal.   Musculoskeletal:         General: Normal range of motion.   Lymphadenopathy:      Cervical: No cervical adenopathy.   Skin:     General: Skin is warm.   Neurological:      General: No focal deficit present.      Mental Status: He is alert.   Psychiatric:         Mood and Affect: Mood normal.         Thought Content: Thought content normal.         Judgment: Judgment normal.         Assessment / Plan      Assessment & Plan:  Problem List Items Addressed This Visit       Hypogonadotropic " hypogonadism    Overview     Normal lh and fsh with repeatedly low testosteorne   Slightly high prolactin  Normal mri          Current Assessment & Plan     Once we do the irina stim, he will need testo replacement. I prefer xyosted if we can get it covered so we will try that first.          Secondary adrenal insufficiency - Primary    Overview     Low acth, low cortisol  Normal mri         Current Assessment & Plan     The most likely cause was the dose of  exogenous steroids in August. Even one dose can cause this.   The plan is to do a irina stim before we commit him to lifelong steroids         Relevant Orders    ACTH    Cortisol    Cortisol    Cortisol        Electronically signed by  : Fran Ball MD   12/18/2024

## 2024-12-19 ENCOUNTER — TELEPHONE (OUTPATIENT)
Dept: ENDOCRINOLOGY | Facility: CLINIC | Age: 44
End: 2024-12-19
Payer: COMMERCIAL

## 2024-12-19 NOTE — TELEPHONE ENCOUNTER
Hub staff attempted to follow warm transfer process and was unsuccessful     Caller: NADINE FITZPATRICK    Relationship to patient: SELF    Best call back number: [phone]    Patient is needing: PATIENT SAW DR SWARTZ YESTERDAY AND HE WANTS HIM TO HAVE A ALICIA STEM TEST DONE. THE PATIENT IS WANTING TO KNOW HOW HE GOES ABOUT SCHEDULING THIS? PLEASE GIVE PATIENT A CALL BACK

## 2024-12-20 ENCOUNTER — LAB (OUTPATIENT)
Dept: ENDOCRINOLOGY | Facility: CLINIC | Age: 44
End: 2024-12-20
Payer: COMMERCIAL

## 2024-12-20 ENCOUNTER — CLINICAL SUPPORT (OUTPATIENT)
Dept: ENDOCRINOLOGY | Facility: CLINIC | Age: 44
End: 2024-12-20
Payer: COMMERCIAL

## 2024-12-20 DIAGNOSIS — E27.49 SECONDARY ADRENAL INSUFFICIENCY: Primary | ICD-10-CM

## 2024-12-20 DIAGNOSIS — E27.49 SECONDARY ADRENAL INSUFFICIENCY: ICD-10-CM

## 2024-12-20 LAB
CORTIS SERPL-MCNC: 1.34 MCG/DL
CORTIS SERPL-MCNC: 5.74 MCG/DL
CORTIS SERPL-MCNC: 7.72 MCG/DL

## 2024-12-20 PROCEDURE — 36415 COLL VENOUS BLD VENIPUNCTURE: CPT | Performed by: INTERNAL MEDICINE

## 2024-12-20 PROCEDURE — 82533 TOTAL CORTISOL: CPT | Performed by: INTERNAL MEDICINE

## 2024-12-20 PROCEDURE — 82024 ASSAY OF ACTH: CPT | Performed by: INTERNAL MEDICINE

## 2024-12-20 RX ORDER — COSYNTROPIN 0.25 MG/ML
0.25 INJECTION, POWDER, FOR SOLUTION INTRAMUSCULAR; INTRAVENOUS ONCE
Status: COMPLETED | OUTPATIENT
Start: 2024-12-20 | End: 2024-12-20

## 2024-12-20 RX ADMIN — COSYNTROPIN 0.25 MG: 0.25 INJECTION, POWDER, FOR SOLUTION INTRAMUSCULAR; INTRAVENOUS at 09:02

## 2024-12-23 ENCOUNTER — OFFICE VISIT (OUTPATIENT)
Dept: FAMILY MEDICINE CLINIC | Facility: CLINIC | Age: 44
End: 2024-12-23
Payer: COMMERCIAL

## 2024-12-23 ENCOUNTER — PATIENT MESSAGE (OUTPATIENT)
Dept: FAMILY MEDICINE CLINIC | Facility: CLINIC | Age: 44
End: 2024-12-23

## 2024-12-23 VITALS
HEART RATE: 81 BPM | TEMPERATURE: 97.6 F | OXYGEN SATURATION: 97 % | SYSTOLIC BLOOD PRESSURE: 124 MMHG | BODY MASS INDEX: 26.74 KG/M2 | WEIGHT: 201.8 LBS | DIASTOLIC BLOOD PRESSURE: 82 MMHG | HEIGHT: 73 IN

## 2024-12-23 DIAGNOSIS — J01.00 ACUTE NON-RECURRENT MAXILLARY SINUSITIS: Primary | ICD-10-CM

## 2024-12-23 DIAGNOSIS — D72.829 LEUKOCYTOSIS, UNSPECIFIED TYPE: ICD-10-CM

## 2024-12-23 DIAGNOSIS — R49.0 PERSISTENT HOARSENESS: ICD-10-CM

## 2024-12-23 DIAGNOSIS — E27.40 ADRENAL INSUFFICIENCY: ICD-10-CM

## 2024-12-23 DIAGNOSIS — E78.5 DYSLIPIDEMIA: ICD-10-CM

## 2024-12-23 DIAGNOSIS — R79.89 LOW TESTOSTERONE IN MALE: ICD-10-CM

## 2024-12-23 LAB — ACTH PLAS-MCNC: 1.6 PG/ML (ref 7.2–63.3)

## 2024-12-23 PROCEDURE — 99214 OFFICE O/P EST MOD 30 MIN: CPT | Performed by: PHYSICIAN ASSISTANT

## 2024-12-23 RX ORDER — HYDROCORTISONE 10 MG/1
TABLET ORAL
Qty: 21 TABLET | Refills: 0 | Status: SHIPPED | OUTPATIENT
Start: 2024-12-23

## 2024-12-30 PROBLEM — E27.40 ADRENAL INSUFFICIENCY: Status: ACTIVE | Noted: 2024-12-30

## 2024-12-30 PROBLEM — R49.0 PERSISTENT HOARSENESS: Status: ACTIVE | Noted: 2024-12-30

## 2024-12-30 PROBLEM — E78.5 DYSLIPIDEMIA: Status: ACTIVE | Noted: 2024-12-30

## 2024-12-30 PROBLEM — R79.89 LOW TESTOSTERONE IN MALE: Status: ACTIVE | Noted: 2024-12-30

## 2024-12-31 NOTE — PROGRESS NOTES
Subjective        Chief Complaint  Follow up generalized weakness     Subjective      Amor Avila is a 44 y.o. male who presents today to [facility] for follow up generalized weakness. He has a past medical history of DDD with chronic neck/back pain, muscle spasms, Hypertension, Prostatitis, and Rheumatoid arthritis.    Low cortisol:   Extremity Weakness:  Generalized fatigue:  Diaphoresis:  Recently presented with 1-2 weeks of generalized fatigue, bilateral lower extremity weakness/heaviness, feeling like he had no energy, along with intermittent episodes of diaphoresis. EKG showed no concerning abnormalities. Extensive lab testing revealed significant findings of low testosterone, very low AM cortisol, and elevated absolute neutrophils and immature granulocytes. Last steroids reported to be an epidural spinal injection with pain management in 08/2024. Urgent endocrinology referral placed with recommendations for MRI pituitary which was normal. ACTH was also low. Prolactin mildly elevated. He has since seen endocrinology and had a cosyntropin stimulation test with failure of cortisol to rise appropriately. Dr. Ball had discussed results with the patient via [employer] with recommendations to start therapy with hydrocortisone. The patient does report that Dr. Ball is to be out of the office this week, but he did start taking some hydrocortisone that he had yesterday and has been taking the dose recommended by Dr. Ball.     Leukocytosis:   Shortly after his initial set of labs with elevated absolute neutrophils and immature granulocytes, he did develop some sinus congestion and runny nose. No fever or chills. Respiratory panel was negative. Given his immunocompromised status, he was sent for a CXR which was unremarkable. Those symptoms have since shown improvement, some persistent sinus congestion reported. Denies any development of fever, cough, rash, wounds, GI upset,  diarrhea, or dysuria.     Dyslipidemia:   Cholesterol recently elevated, results listed below. Denies any known family history of premature CAD or strokes.       Hoarseness:   Has chronic seasonal and environmental allergies with intermittent flares and hoarseness of his voice. Does report some chronic raspy voice. No history of smoking cigarettes, but has used chewing tobacco in the past.     DDD of the cervical spine:   DDD of the lumbar spine:  He has a several year history of chronic neck, back, left shoulder pain.  He also has right-sided carpal tunnel syndrome and left-sided cubital tunnel syndrome.  He is followed with orthopedics in the past without surgical recommendations.  MRI C-spine in 2020 showed C5-C6 disc desiccation and broad-based posterior disc bulging causing mild left neuroforaminal and lateral recess stenosis.  C6-C7 broad-based posterior disc bulging causing left neural foraminal and left lateral recess stenosis.  MRI L-spine in 12/2020 showed degenerative disc changes in the lower 2 lumbar disc spaces.  Osteoarthritis in the facet joints that does mildly narrow the lateral recesses at L4-L5.  He reports having a MRI of the left shoulder within the last year or so with reported stretching of the AC noted. No surgical intervention recommended at that time.  Report not currently available.    Previously had trigger point injections, physical therapy, massage therapy, TENS unit.  Previously tried medical therapy includes Zanaflex, Flexeril, Robaxin, baclofen, meloxicam, Celebrex, duloxetine, Tylenol No. 4, tramadol, gabapentin.  Nothing has ever given lasting relief.  Has had a few months of relief with trigger point injections in the past.    He was referred to [facility] management at [facility].  Subsequent MRI of the cervical spine showed multilevel degenerative disc disease.  Multiple areas of broad-based disc bulge.     Hypertension:   BP in the office today is 124/82.  Not  "currently on any medical therapy.    Rheumatoid arthritis:   Following with rheumatology at [facility].  Previously on Humira.  This was discontinued due to suspected lack of efficacy for his disease management and he was transition to Xeljanz about 3 to 4 months ago.     Has been on methotrexate, Plaquenil in the past. Joints affected are mostly his hands and ankles.    Asthma:   Allergies:   Takes a daily antihistamine OTC. Has symbicort available, but denies any recent use.       Current Outpatient Medications:     acetaminophen-codeine (TYLENOL #4) 300-60 MG per tablet, Take 1 tablet by mouth Every 4 (Four) Hours As Needed for Moderate Pain., Disp: , Rfl:     albuterol sulfate  (90 Base) MCG/ACT inhaler, , Disp: , Rfl:     budesonide-formoterol (SYMBICORT) 160-4.5 MCG/ACT inhaler, , Disp: , Rfl:     EPINEPHrine (EPIPEN) 0.3 MG/0.3ML solution auto-injector injection, , Disp: , Rfl:     gabapentin (NEURONTIN) 800 MG tablet, Take 1 tablet by mouth 3 (Three) Times a Day., Disp: , Rfl:     ibuprofen (ADVIL,MOTRIN) 800 MG tablet, Take 1 tablet by mouth Every 6 (Six) Hours As Needed for Mild Pain., Disp: , Rfl:     pantoprazole (PROTONIX) 40 MG EC tablet, , Disp: , Rfl:     Tofacitinib Citrate ER (Xeljanz XR) 11 MG tablet sustained-release 24 hour, Take 1 tablet by mouth Daily., Disp: , Rfl:     traMADol (ULTRAM) 50 MG tablet, Take 1 tablet by mouth Every 8 (Eight) Hours As Needed for Moderate Pain., Disp: , Rfl:     amoxicillin-clavulanate (AUGMENTIN) 875-125 MG per tablet, Take 1 tablet by mouth Every 12 (Twelve) Hours for 10 days., Disp: 20 tablet, Rfl: 0    hydrocortisone (CORTEF) 10 MG tablet, Take 20mg (2 tablets) of the AM, 10mg (1 tablet) of the PM., Disp: 21 tablet, Rfl: 0      No Known Allergies    Objective     Objective   Vital Signs:  /82   Pulse 81   Temp 97.6 °F (36.4 °C) (Temporal)   Ht 185.4 cm (73\")   Wt 91.5 kg (201 lb 12.8 oz)   SpO2 97%   BMI 26.62 kg/m²   Estimated body " "mass index is 26.62 kg/m² as calculated from the following:    Height as of this encounter: 185.4 cm (73\").    Weight as of this encounter: 91.5 kg (201 lb 12.8 oz).    BMI is >= 25 and <30. (Overweight) The following options were offered after discussion;: weight loss educational material (shared in after visit summary)    Past Medical History:   Diagnosis Date    Cervical disc disorder     Chronic pain disorder     Extremity pain     Headache     Hyperlipidemia 12/10/24    Hypertension     Joint pain     Low back pain     Migraine     Neck pain     Peripheral neuropathy     Prostatitis, chronic 08/14/2019    Rheumatoid arthritis     Testosterone deficiency 12/10/24     Past Surgical History:   Procedure Laterality Date    NO PAST SURGERIES      TRIGGER POINT INJECTION       Social History     Socioeconomic History    Marital status:    Tobacco Use    Smoking status: Never     Passive exposure: Never    Smokeless tobacco: Never    Tobacco comments:     Former smokeless tobacco user   Vaping Use    Vaping status: Never Used   Substance and Sexual Activity    Alcohol use: Yes     Alcohol/week: 2.0 standard drinks of alcohol     Types: 2 Cans of beer per week     Comment: OCC    Drug use: No    Sexual activity: Yes     Partners: Female     Birth control/protection: Vasectomy, OCP      Physical Exam  Vitals and nursing note reviewed.   Constitutional:       General: He is not in acute distress.     Appearance: He is well-developed. He is not diaphoretic.   HENT:      Head: Normocephalic and atraumatic.      Right Ear: Tympanic membrane and ear canal normal.      Left Ear: Tympanic membrane and ear canal normal.      Mouth/Throat:      Pharynx: No oropharyngeal exudate or posterior oropharyngeal erythema.   Eyes:      General: No scleral icterus.        Right eye: No discharge.         Left eye: No discharge.      Conjunctiva/sclera: Conjunctivae normal.   Neck:      Thyroid: No thyroid mass, thyromegaly or " thyroid tenderness.      Vascular: No carotid bruit.      Trachea: Trachea normal.   Cardiovascular:      Rate and Rhythm: Normal rate and regular rhythm.      Heart sounds: Normal heart sounds. No murmur heard.     No friction rub. No gallop.   Pulmonary:      Effort: Pulmonary effort is normal. No respiratory distress.      Breath sounds: Normal breath sounds. No wheezing or rales.   Chest:      Chest wall: No tenderness.   Musculoskeletal:         General: No tenderness. Normal range of motion.      Cervical back: Normal range of motion and neck supple. No rigidity or tenderness.   Lymphadenopathy:      Cervical: No cervical adenopathy.      Upper Body:      Right upper body: No supraclavicular adenopathy.      Left upper body: No supraclavicular adenopathy.   Skin:     General: Skin is warm and dry.      Coloration: Skin is not pale.      Findings: No erythema or rash.   Neurological:      General: No focal deficit present.      Mental Status: He is alert and oriented to person, place, and time.   Psychiatric:         Mood and Affect: Mood normal.         Behavior: Behavior normal.        Result Review :  The following data was reviewed by: LESLIE Bragg on 06/05/2024:  Hemoglobin A1C   Date Value Ref Range Status   12/05/2024 5.30 4.80 - 5.60 % Final     TSH   Date Value Ref Range Status   12/05/2024 0.832 0.270 - 4.200 uIU/mL Final     HDL Cholesterol   Date Value Ref Range Status   12/10/2024 66 (H) 40 - 60 mg/dL Final     LDL Cholesterol    Date Value Ref Range Status   12/10/2024 171 (H) 0 - 100 mg/dL Final     Triglycerides   Date Value Ref Range Status   12/10/2024 117 0 - 150 mg/dL Final         Assessment / Plan         Assessment   Diagnoses and all orders for this visit:    1. Acute non-recurrent maxillary sinusitis  Continue home antihistamine and allergy nasal spray.   We discussed that the hydrocortisone he started yesterday may also help with his sinus congestion/allergies. Encouraged to  see how his symptoms do over the next few days and if improvement noted, continue current therapy. If not, I did send a RX for Augmentin 875-125mg BID x 10 days.   Return to clinic if no improvement noted or if symptoms are worsening.   - amoxicillin-clavulanate (AUGMENTIN) 875-125 MG per tablet; Take 1 tablet by mouth Every 12 (Twelve) Hours for 10 days.  Dispense: 20 tablet; Refill: 0    2. Persistent hoarseness  Given history of chewing tobacco with persistent hoarseness of voice, will refer to ENT for further evaluation.   - Ambulatory Referral to ENT (Otolaryngology)    3. Adrenal insufficiency  4. Low testosterone in male  Continue follow up with Endocrinology. He did start the recommended dose of hydrocortisone yesterday, but does report Dr. Ball will be out of the office this week. Recommendations reviewed and RX sent for 1 week of hydrocortisone 20mg AM, 10mg PM. Patient plans to contact Dr. Murphy's office early next week to obtain further medication. He is to let me know if he has difficulty.   He reports they plan to supplement with hydrocortisone prior to starting testosterone supplementation.   - hydrocortisone (CORTEF) 10 MG tablet; Take 20mg (2 tablets) of the AM, 10mg (1 tablet) of the PM.  Dispense: 21 tablet; Refill: 0    4. Leukocytosis, unspecified type  Given the patient did start hydrocortisone yesterday, repeat CBC was not obtained today as this will likely skew results.   At this time, he denies any signs/symptoms to suggest infection other than the sinus symptoms reported above. CXR negative as well as other inflammatory markers. (ESR/CRP)  We discussed concerning signs/symptoms to monitor for and to return to clinic or contact the office if these are noted. He expressed understanding and agreed.     5. Dyslipidemia   Discussed options with the patient. Low calculated 10 year risk at this time and no statin therapy recommended. Patient wishes to try 3 months of dietary changes and exercise  and we will repeat levels then. May consider medical therapy should levels remain significantly elevated or if they rise. Would like to avoid statins at this time given recent muscle weakness to avoid potential side effects which may mask other symptoms of his current issues.     6. DDD (degenerative disc disease), cervical  7. Lumbar degenerative disc disease  Continue regular follow-up with pain management at [facility].    8. Rheumatoid arthritis involving multiple sites, unspecified whether rheumatoid factor present  On Xeljanz x 3 to 4 months.  Has followed up with rheumatology since it was started with no concerns noted.  Continue follow up with [facility].        New Medications Ordered This Visit   Medications    amoxicillin-clavulanate (AUGMENTIN) 875-125 MG per tablet     Sig: Take 1 tablet by mouth Every 12 (Twelve) Hours for 10 days.     Dispense:  20 tablet     Refill:  0    hydrocortisone (CORTEF) 10 MG tablet     Sig: Take 20mg (2 tablets) of the AM, 10mg (1 tablet) of the PM.     Dispense:  21 tablet     Refill:  0     Health Maintenance:  Had colonoscopy several years ago with 5 yr repeat recommended 2/2 polyps. He did have the repeat with no concerning findings. 10 yr repeat then recommended.   Consider updating Tdap.     Follow Up   Return in about 3 months (around 3/23/2025).    Patient was given instructions and counseling regarding his condition or for health maintenance advice. Please see specific information pulled into the AVS if appropriate.       This document has been electronically signed by LESLIE Bragg   December 30, 2024 22:19 EST    Dictated Utilizing Dragon Dictation: Part of this note may be an electronic transcription/translation of spoken language to printed text using the Dragon Dictation System.

## 2025-01-07 DIAGNOSIS — E27.40 ADRENAL INSUFFICIENCY: ICD-10-CM

## 2025-01-07 RX ORDER — HYDROCORTISONE 10 MG/1
TABLET ORAL
Qty: 21 TABLET | Refills: 5 | Status: SHIPPED | OUTPATIENT
Start: 2025-01-07

## 2025-01-09 ENCOUNTER — PRIOR AUTHORIZATION (OUTPATIENT)
Dept: ENDOCRINOLOGY | Facility: CLINIC | Age: 45
End: 2025-01-09
Payer: COMMERCIAL

## 2025-01-09 DIAGNOSIS — E23.0 HYPOGONADOTROPIC HYPOGONADISM: Primary | ICD-10-CM

## 2025-01-09 RX ORDER — TESTOSTERONE ENANTHATE 75 MG/.5ML
75 INJECTION SUBCUTANEOUS WEEKLY
Qty: 2 ML | Refills: 5 | Status: SHIPPED | OUTPATIENT
Start: 2025-01-09

## 2025-01-09 NOTE — TELEPHONE ENCOUNTER
Amor FITZPATRICK (Key: [id number])  PA Case ID #: [id number]  Rx #: [id number]  Need Help? Call us at [phone]  Outcome  Approved today by [employer] 2017  PA Case: [id number], Status: Approved, Coverage Starts on: 1/9/2025 12:00:00 AM, Coverage Ends on: 1/9/2026 12:00:00 AM.  Authorization Expiration Date: 1/8/2026  Drug  Xyosted 75MG/0.5ML auto-injectors  ePA cloud logo  Form  [place] [facility] Electronic PA Form

## 2025-01-22 ENCOUNTER — OFFICE VISIT (OUTPATIENT)
Age: 45
End: 2025-01-22
Payer: COMMERCIAL

## 2025-01-22 VITALS
BODY MASS INDEX: 26.33 KG/M2 | HEIGHT: 73 IN | HEART RATE: 92 BPM | DIASTOLIC BLOOD PRESSURE: 78 MMHG | WEIGHT: 198.7 LBS | SYSTOLIC BLOOD PRESSURE: 114 MMHG | OXYGEN SATURATION: 95 %

## 2025-01-22 DIAGNOSIS — E23.0 HYPOGONADOTROPIC HYPOGONADISM: Primary | ICD-10-CM

## 2025-01-22 DIAGNOSIS — E04.1 SOLITARY THYROID NODULE: ICD-10-CM

## 2025-01-22 DIAGNOSIS — E27.40 ADRENAL INSUFFICIENCY: ICD-10-CM

## 2025-01-22 PROBLEM — R79.89 LOW TESTOSTERONE IN MALE: Status: RESOLVED | Noted: 2024-12-30 | Resolved: 2025-01-22

## 2025-01-22 LAB
ALBUMIN SERPL-MCNC: 4.1 G/DL (ref 3.5–5.2)
ALBUMIN/GLOB SERPL: 1.8 G/DL
ALP SERPL-CCNC: 66 U/L (ref 39–117)
ALT SERPL W P-5'-P-CCNC: 16 U/L (ref 1–41)
ANION GAP SERPL CALCULATED.3IONS-SCNC: 8.5 MMOL/L (ref 5–15)
AST SERPL-CCNC: 20 U/L (ref 1–40)
BILIRUB SERPL-MCNC: 0.3 MG/DL (ref 0–1.2)
BUN SERPL-MCNC: 6 MG/DL (ref 6–20)
BUN/CREAT SERPL: 4.7 (ref 7–25)
CALCIUM SPEC-SCNC: 9.2 MG/DL (ref 8.6–10.5)
CHLORIDE SERPL-SCNC: 101 MMOL/L (ref 98–107)
CO2 SERPL-SCNC: 29.5 MMOL/L (ref 22–29)
CREAT SERPL-MCNC: 1.29 MG/DL (ref 0.76–1.27)
EGFRCR SERPLBLD CKD-EPI 2021: 70.1 ML/MIN/1.73
GLOBULIN UR ELPH-MCNC: 2.3 GM/DL
GLUCOSE SERPL-MCNC: 83 MG/DL (ref 65–99)
POTASSIUM SERPL-SCNC: 4.1 MMOL/L (ref 3.5–5.2)
PROT SERPL-MCNC: 6.4 G/DL (ref 6–8.5)
SODIUM SERPL-SCNC: 139 MMOL/L (ref 136–145)

## 2025-01-22 PROCEDURE — 99214 OFFICE O/P EST MOD 30 MIN: CPT | Performed by: INTERNAL MEDICINE

## 2025-01-22 PROCEDURE — 84403 ASSAY OF TOTAL TESTOSTERONE: CPT | Performed by: INTERNAL MEDICINE

## 2025-01-22 PROCEDURE — 76536 US EXAM OF HEAD AND NECK: CPT | Performed by: INTERNAL MEDICINE

## 2025-01-22 PROCEDURE — 80053 COMPREHEN METABOLIC PANEL: CPT | Performed by: INTERNAL MEDICINE

## 2025-01-22 NOTE — PROGRESS NOTES
"     Office Note      Date: 2025  Patient Name: Amor Avila  MRN: [id number]  : 1980    Chief Complaint   Patient presents with    Secondary adrenal insufficiency       History of Present Illness:   Amor Avila is a 44 y.o. male who presents for Secondary adrenal insufficiency  . And low T  Current rx: cortef and xyosted     Changes in history: put  him on cortef due to abnormal irina stim. He noted little improvement and contacted me. We then started him on testosterone. He was also found to have a small thyroid nodule   Questions /problems: see above . He has taken taken 2 testo shots and feels some better     Subjective          Review of Systems:   Review of Systems   Constitutional:  Positive for fatigue.       The following portions of the patient's history were reviewed and updated as appropriate: allergies, current medications, past family history, past medical history, past social history, past surgical history, and problem list.    Objective     Visit Vitals  /78 (BP Location: Left arm, Patient Position: Sitting)   Pulse 92   Ht 185.4 cm (73\")   Wt 90.1 kg (198 lb 11.2 oz)   SpO2 95%   BMI 26.22 kg/m²           Physical Exam:  Physical Exam  Vitals reviewed.   Constitutional:       Appearance: Normal appearance.   Neck:      Comments:  Fullness left thyroid lobe   Lymphadenopathy:      Cervical: No cervical adenopathy.   Neurological:      Mental Status: He is alert.   Psychiatric:         Mood and Affect: Mood normal.         Behavior: Behavior normal.         Thought Content: Thought content normal.         Judgment: Judgment normal.       Assessment / Plan      Assessment & Plan:  Problem List Items Addressed This Visit       Hypogonadotropic hypogonadism - Primary    Overview     Normal lh and fsh with repeatedly low testosteorne   Slightly high prolactin  Normal mri          Current Assessment & Plan     On replacment now and doing some better. Will check levels and adjust       "    Relevant Medications    Testosterone Enanthate (Xyosted) 75 MG/0.5ML solution auto-injector    Other Relevant Orders    Testosterone    Adrenal insufficiency    Overview     Umair stim showed low baseline and sub optimal response   Pituitary mri normal         Current Assessment & Plan     Stable. He is on standard treatment. Will check NA and K and adjust dose if needed          Relevant Medications    hydrocortisone (CORTEF) 10 MG tablet    Other Relevant Orders    Comprehensive Metabolic Panel    Solitary thyroid nodule    Overview     1/22/25  Procedure: thyroid ultrasound  Indication- lump in left neck  Results- the thyroid is normal in size and is homogenous  There is a 1.6 cm trad 3 spongiform nodule in the left lobe where it intersects the isthmus.           Current Assessment & Plan     Nodule as above. Will plan to recheck ultrasound in a year and exam neck at 6 months point         Relevant Medications    hydrocortisone (CORTEF) 10 MG tablet    Other Relevant Orders    US Thyroid (Completed)        Electronically signed by : Fran Ball MD   01/22/2025

## 2025-01-23 LAB — TESTOST SERPL-MCNC: 608 NG/DL (ref 249–836)

## 2025-02-12 ENCOUNTER — TELEPHONE (OUTPATIENT)
Dept: PAIN MEDICINE | Facility: CLINIC | Age: 45
End: 2025-02-12
Payer: COMMERCIAL

## 2025-02-12 NOTE — TELEPHONE ENCOUNTER
Caller: Amor Avila    Relationship: Self    Best call back number: [phone]    What is the best time to reach you: ANY     Who are you requesting to speak with (clinical staff, provider,  specific staff member): CLINICAL     Do you know the name of the person who called: YES     What was the call regarding: PATIENT STATES THAT HE IS HAVING PAIN AFTER NERVE BLOCK 11/2024- WOULD LIKE TO KNOW WHAT CAN BE DONE UNTIL FOLLOW UP

## 2025-02-13 ENCOUNTER — OFFICE VISIT (OUTPATIENT)
Dept: PAIN MEDICINE | Facility: CLINIC | Age: 45
End: 2025-02-13
Payer: COMMERCIAL

## 2025-02-13 VITALS — WEIGHT: 212 LBS | BODY MASS INDEX: 28.1 KG/M2 | HEIGHT: 73 IN

## 2025-02-13 DIAGNOSIS — R20.0 NUMBNESS AND TINGLING OF HAND: Primary | ICD-10-CM

## 2025-02-13 DIAGNOSIS — M54.12 CERVICAL RADICULOPATHY: Primary | ICD-10-CM

## 2025-02-13 DIAGNOSIS — M47.817 LUMBOSACRAL SPONDYLOSIS WITHOUT MYELOPATHY: ICD-10-CM

## 2025-02-13 DIAGNOSIS — M47.812 CERVICAL SPONDYLOSIS WITHOUT MYELOPATHY: ICD-10-CM

## 2025-02-13 DIAGNOSIS — M48.062 SPINAL STENOSIS OF LUMBAR REGION WITH NEUROGENIC CLAUDICATION: ICD-10-CM

## 2025-02-13 DIAGNOSIS — R20.2 NUMBNESS AND TINGLING OF HAND: Primary | ICD-10-CM

## 2025-02-13 PROCEDURE — 99213 OFFICE O/P EST LOW 20 MIN: CPT

## 2025-02-13 NOTE — PROGRESS NOTES
Referring Physician: No referring provider defined for this encounter.    Primary Physician: Misa Barragan PA    CHIEF COMPLAINT or REASON FOR VISIT: Follow-up (Failed CMBB) and Neck Pain      Initial history of present illness on 06/26/2024:  Mr. Amor Avila is 44 y.o. male who presents as a new patient referral for evaluation treatment of chronic neck pain with radiation to bilateral upper extremities and hands.  On his right hand this radiates into his thumb index and long finger, in his left hand this radiates into the pinky and ring fingers.  He has been evaluated in the past for this issue by Dr. Flores, neurosurgery, did not identify any surgical intervention necessary at the time.  He does also have bilateral peripheral neuropathy in his feet.  He has been evaluated by rheumatology in the past, was initially diagnosed with rheumatoid arthritis and given Humira which was not helpful and subsequently had further testing which removed the diagnosis of rheumatoid arthritis.  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  He has never had any injection or intervention.  Patient denies any bowel or bladder dysfunction, lower extremity weakness, new onset saddle anesthesia or unexplained weight loss.   Additionally describes increasing bilateral posterior thigh heaviness and weakness with ambulation and standing.    Interval history:  Patient returns to clinic today after undergoing cervical medial branch blocks.  Unfortunately, this did not provide him with any relief.  Today, he continues to complain of chronic neck pain.  He denies any particular radiation into the upper extremities.  Most of his pain is within the paraspinal musculature, bilateral trapezius, and periscapular borders.  He does feel as if this is a muscle pain in nature.  He has tried and failed multiple muscle relaxers including baclofen, Flexeril, Robaxin, tizanidine.  He takes Celebrex and Tylenol 4 as  needed.  He continues to do well with his back pain.  He has previously been diagnosed with carpal tunnel on the right upper extremity and cubital tunnel on the left upper extremity.  He will occasionally have numbness and tingling in the hands but this is not particularly bothersome for him.  He does have a TENS unit at home that provides him with some relief.      Interventions:  8/13/2024: JEAN with excellent relief for 2 days.  9/19/2024: LESI with 75-80% relief ongoing  11/26/2024: C3/4 and C4/5 MBB with 15% relief    Objective Pain Scoring:   BRIEF PAIN INVENTORY:  Total score:   Pain Score    02/13/25 0905   PainSc:   7   PainLoc: Neck        PHQ-2:    PHQ-9:    Opioid Risk Tool:         Review of Systems:   ROS negative except as otherwise noted     Past Medical History:   Past Medical History:   Diagnosis Date    Cervical disc disorder     Chronic pain disorder     Extremity pain     Headache     Hyperlipidemia 12/10/24    Hypertension     Joint pain     Low back pain     Migraine     Neck pain     Peripheral neuropathy     Prostatitis, chronic 08/14/2019    Rheumatoid arthritis     Testosterone deficiency 12/10/24         Past Surgical History:   Past Surgical History:   Procedure Laterality Date    NO PAST SURGERIES      TRIGGER POINT INJECTION           Family History   Family History   Problem Relation Age of Onset    Cancer Mother         Leukemia    Osteoarthritis Father     Hypertension Father     Cancer Father         Melanoma    Cancer Maternal Grandmother         Thyroid    Cancer Maternal Grandfather         Lung    Cancer Paternal Grandmother         Pancreatic         Social History   Social History     Socioeconomic History    Marital status:    Tobacco Use    Smoking status: Never     Passive exposure: Never    Smokeless tobacco: Never    Tobacco comments:     Former smokeless tobacco user   Vaping Use    Vaping status: Never Used   Substance and Sexual Activity    Alcohol use: Yes      "Alcohol/week: 2.0 standard drinks of alcohol     Types: 2 Cans of beer per week     Comment: OCC    Drug use: No    Sexual activity: Yes     Partners: Female     Birth control/protection: Vasectomy, OCP        Medications:     Current Outpatient Medications:     acetaminophen-codeine (TYLENOL #4) 300-60 MG per tablet, Take 1 tablet by mouth Every 4 (Four) Hours As Needed for Moderate Pain., Disp: , Rfl:     albuterol sulfate  (90 Base) MCG/ACT inhaler, , Disp: , Rfl:     budesonide-formoterol (SYMBICORT) 160-4.5 MCG/ACT inhaler, , Disp: , Rfl:     EPINEPHrine (EPIPEN) 0.3 MG/0.3ML solution auto-injector injection, , Disp: , Rfl:     gabapentin (NEURONTIN) 800 MG tablet, Take 1 tablet by mouth 3 (Three) Times a Day., Disp: , Rfl:     hydrocortisone (CORTEF) 10 MG tablet, Take 20mg (2 tablets) of the AM, 10mg (1 tablet) of the PM., Disp: 21 tablet, Rfl: 5    ibuprofen (ADVIL,MOTRIN) 800 MG tablet, Take 1 tablet by mouth Every 6 (Six) Hours As Needed for Mild Pain., Disp: , Rfl:     pantoprazole (PROTONIX) 40 MG EC tablet, , Disp: , Rfl:     Testosterone Enanthate (Xyosted) 75 MG/0.5ML solution auto-injector, Inject 75 mg under the skin into the appropriate area as directed 1 (One) Time Per Week., Disp: 2 mL, Rfl: 5    Tofacitinib Citrate ER (Xeljanz XR) 11 MG tablet sustained-release 24 hour, Take 1 tablet by mouth Daily., Disp: , Rfl:     traMADol (ULTRAM) 50 MG tablet, Take 1 tablet by mouth Every 8 (Eight) Hours As Needed for Moderate Pain., Disp: , Rfl:         Physical Exam:     Vitals:    02/13/25 0905   Weight: 96.2 kg (212 lb)   Height: 185.4 cm (72.99\")   PainSc:   7   PainLoc: Neck          General: Alert and oriented, No acute distress.   HEENT: Normocephalic, atraumatic.   Cardiovascular: No gross edema  Respiratory: Respirations are non-labored    Cervical Spine:   No masses or atrophy  Range of motion - Flexion normal. Extension normal. Lateral rotation normal.   Palpation - nontender   Spurling's " -positive bilaterally  Paraspinal musculature tender to palpation    Thoracic Spine:   Inspection: no gross abnormality  Paraspinal muscle palpation: Tender bilaterally  Spinous process palpation: nontender    Lumbar Spine:   No masses or atrophy  Range of motion - Flexion normal. Extension normal.    Facet Loading: Negative bilaterally  Facet Palpation - Nontender   Jeffrey finger/Gaenslen's/Gopi's/EBONY/Thigh thrust -   Straight leg raise/slump test: Negative bilaterally      Motor Exam:    Strength: Rate on 1-5 scale Right Left    C5-Elbow flexion, Deltoid 5/5  5/5    C6-Wrist extension 5/5  5/5    C7- Elbow / finger extension 5/5  5/5    C8- Finger flexion 5/5  5/5    T1- Intrinsics hand 5/5  5/5    Strength: Rate on 1-5 scale Right Left    L1/2- hip flexion 5/5  5/5    L3- knee extension 5/5  5/5    L4- ankle dorsiflexion 5/5  5/5    L5- great toe extension 5/5  5/5    S1- ankle plantarflexion 5/5  5/5    Sensory Exam: Altered sensation right C5-6 dermatome, left C8 dermatome  Bilateral shoulder exams:  Guzman Obi negative  Empty can negative  AC cross body test negative  Neer's test negative      Neurologic: Cranial Nerves II-XII are grossly intact.   Bailey’s -negative bilaterally     Psychiatric: Cooperative.   Gait: Normal   Assistive Devices: None    Imaging Studies:   Results for orders placed during the hospital encounter of 12/16/20    MRI Lumbar Spine Without Contrast    Narrative  MRI LUMBAR SPINE WITHOUT CONTRAST-    REASON FOR EXAM:  Low back pain, > 6 wks; G56.03-Carpal tunnel syndrome,  bilateral upper limbs; M51.36-Other intervertebral disc degeneration,  lumbar region    TECHNIQUE: In the sagittal and axial imaging planes T1 and T2 weighted  sequences were acquired through the lumbar spine.  The axial sequences  were acquired through the lumbar disc spaces.    MRI FINDINGS : The lumbar vertebral bodies are normal in height and  alignment. The vertebral bodies show normal marrow  signal. The upper  lumbar discs were well maintained in height and show bright signal  intensity. There is loss of disc height and disc desiccation at L4-L5  and L5-S1. In the axial plane at L4-L5, there is mild facet joint  arthritis slightly narrowing the lateral recesses. There is mild  broad-based disc bulging as well. At L5-S1, the spinal canal was  maintained in contour. There is mild facet joint arthritis as well.    Impression  Degenerative disc changes in the lower 2 lumbar disc spaces.  There is osteoarthritis in the facet joints that does mildly narrow the  lateral recesses at L4-L5.    This report was finalized on 12/16/2020 10:32 AM by Dr. Bi Jenkins II, MD.      PROCEDURE: MRI CERVICAL SPINE WO CONTRAST-     HISTORY: Cervical radiculopathy; M54.12-Radiculopathy, cervical region     COMPARISON: None.     TECHNIQUE: Multiplanar multisequence imaging of the cervical spine was  performed without intravenous contrast.     FINDINGS: The cervical vertebral bodies maintain a normal height,  alignment and signal intensity. The posterior elements are intact.     At the C3/4 level there is a broad-based disc bulge, however there is no  significant spinal stenosis or neural foraminal narrowing.     At the C4/5 level there is a broad-based disc bulge and mild left  greater than right facet arthropathy, however there is no significant  spinal stenosis or neural foraminal narrowing.     At the C5/6 level there is a posterior disc osteophyte complex and facet  arthropathy which produces mild bilateral neural foraminal narrowing.  There is no significant spinal stenosis.     At the C6/7 level there is a left paracentral disc protrusion which  produces mild spinal stenosis and moderate left neural foraminal  narrowing.     The cervical portions of the spinal cord maintains a normal caliber and  signal intensity. The visualized posterior fossa is normal. The  paraspinal soft tissues are unremarkable.      IMPRESSION:  Multilevel degenerative changes as detailed above.        This report was finalized on 7/20/2024 11:09 AM by Yannick Quinn M.D..    PROCEDURE: MRI LUMBAR SPINE WO CONTRAST-     HISTORY: Spinal stenosis of lumbar region with neurogenic claudication;  M48.061-Spinal stenosis, lumbar region without neurogenic claudication     COMPARISON: None.     TECHNIQUE: Multiplanar multisequence imaging of the lumbar spine was  performed without intravenous contrast.     FINDINGS: The lumbar vertebral bodies maintain a normal height,  alignment and signal intensity. The posterior elements are intact  throughout.     At the L4/5 level there is a broad-based disc bulge with an outer  annular tear. This combined with facet arthropathy produces mild spinal  stenosis and mild bilateral neural foraminal narrowing.     At the L5/S1 level there is a broad-based disc bulge and facet  arthropathy without significant spinal stenosis or neural foraminal  narrowing.     The spinal cord terminates at the T12 level. No conus lesions are  present. The paraspinal soft tissues are unremarkable.     IMPRESSION:  Degenerative change in the lower lumbar spine as detailed  above.        This report was finalized on 7/20/2024 10:46 AM by Yannick Quinn M.D..     Independent review of radiographic imaging: Available for my interpretation is a cervical MRI dated October 20, 2020: There is left C3/C4 neuroforaminal stenosis; there is left C5/C6 neuroforaminal stenosis; there is mild bilateral C6/C7 foraminal stenosis; canal is patent.    Cervical MRI dated 7/20/2024 demonstrates: DDD; left C3/C4 NFS; mild bilateral C5/C6 NFS; left C6/C7 disc protrusion resulting in moderate left NFS, mild canal stenosis; multiple levels of facet arthropathy.     Lumbar MRI dated 7/20/2024 demonstrates: DDD worst at L4/5, L5/S1; broad-based disc bulge at L4/5 resulting in mild canal stenosis as well as mild bilateral NFS; moderate annular tear at  L4/5; facet arthropathy worst at L4/5 and L5/S1        Impression & Plan:       06/26/2024: Amor Avila is a 44 y.o. male with past medical history significant for peripheral neuropathy who presents to the pain clinic for evaluation and treatment of chronic neck pain with radiation to bilateral upper extremities, chronic bilateral lower extremity heaviness and weakness with ambulation.  Upper extremity symptoms consistent with cervical radiculopathy, lower extremity symptoms consistent with lumbar spinal stenosis neurogenic claudication.  MRI interpretation of old cervical MRI as above.  Will obtain new cervical MRI and lumbar MRI to evaluate for cervical radiculopathy, lumbar spinal stenosis with neurogenic claudication.  I will also give him referral to physical therapy.  7/31/2024: Cervical and lumbar MRIs reviewed.  Examination most consistent with cervical radiculopathy, cervical spondylosis, lumbosacral spondylosis, lumbar spinal stenosis.  We discussed JEAN and LESI.  Can consider CMBB, LMBB, EMG/NCV.   11/12/2024: Good relief from LESI.  Can consider repeat if needed.  Good but transient relief from JEAN.  Will proceed with bilateral C3/4/5 MBB/RFA.  Additionally, will obtain EMGs/NCV of BUE to investigate potential cervical radiculopathy versus carpal tunnel syndrome/cubital tunnel syndrome.  2/13/2025: Minimal relief from C MBB.  Will plan for bilateral cervical and thoracic trigger point injections.  Can consider repeat LESI.  Obtain EMG/NCV of BUE to investigate and numbness and tingling.    1. Cervical radiculopathy    2. Cervical spondylosis without myelopathy    3. Spinal stenosis of lumbar region with neurogenic claudication    4. Lumbosacral spondylosis without myelopathy              PLAN:  1. Medication Recommendations: Recommend Voltaren topical, NSAIDs, Tylenol.  Can trial turmeric 500 mg twice daily if NSAID contraindicated.    2. Physical Therapy: Continue PT  -Will provide patient with TENS  unit from [facility]    3. Psychological: defer    4. Complementary and alternative (CAM) Therapies:     5. Labs/Diagnostic studies: Obtain EMG/NCV of BUE    6. Imagin. Interventions: Schedule bilateral cervical and thoracic trigger point injections.   We discussed a steroid injection to improve pain.  If greater than 50% relief for at least 2 to 3 months can consider repeat as needed every 3 to 4 months.  Had a discussion with the patient regarding the risk of the procedure including bleeding, infection, damage to surrounding structures.  We discussed the potential adverse effects of corticosteroid injection including flushing of the face, lipodystrophy, skin discoloration, elevated blood glucose, increased blood pressure.  Risks of frequent steroid administration includes weight gain, hormonal changes, mood changes, osteoporosis.   -Can consider repeat LESI      8. Referrals:     9. Records: n/a    10. Lifestyle goals:    Follow-up 3 months    [facility]  Geeta Benitez PA-C          Quality Metrics:  Patient screened positive for depression based on a PHQ-9 score of 14 on 2025. Follow-up recommendations include: PCP managing depression.

## 2025-02-18 ENCOUNTER — OUTSIDE FACILITY SERVICE (OUTPATIENT)
Dept: PAIN MEDICINE | Facility: CLINIC | Age: 45
End: 2025-02-18
Payer: COMMERCIAL

## 2025-02-18 ENCOUNTER — DOCUMENTATION (OUTPATIENT)
Dept: PAIN MEDICINE | Facility: CLINIC | Age: 45
End: 2025-02-18

## 2025-02-18 PROCEDURE — 20553 NJX 1/MLT TRIGGER POINTS 3/>: CPT | Performed by: STUDENT IN AN ORGANIZED HEALTH CARE EDUCATION/TRAINING PROGRAM

## 2025-02-18 NOTE — PROGRESS NOTES
[facility]  [address] [place] [address]        PROCEDURE: Trigger Point Injections  MUSCLES INJECTED: bilateral semispinalis , rhomboids, trapezius  PRE-OP DIAGNOSIS: Myofscial pain  POST-OP DIAGNOSIS: Same    ANTIPLATELET/ANTICOAGULANT STOP DATE: Discussed with patient and managed according to TONIE guidelines    CONSENT: Risks, benefits and options were explained to the patient, all questions were answered and written informed consent was obtained.    ANESTHESIA: None    PROCEDURE NOTE:  After timeout was performed, the patient was placed in the prone position with all pressure points padded and the selected side upward. The areas of maximal muscle tenderness and spasm were identified and marked. The skin was prepped with chlorhexidine. Then a 27 gauge 1.5 inch needle was inserted into the trigger point. After negative aspiration, 1 mL of a mixture containing 5 cc 1% lidocaine, 3 cc 0.5% bupivacaine, 30 mg Toradol, 40 mg methylprednisolone was injected into each trigger point for a total of 10 cc.  Pressure was held and a dressing placed.     EBL: None    COMPLICATIONS: None    The patient was monitored until meeting appropriate discharge criteria. Vital signs remained stable. There were no immediate complications and the patient tolerated the procedure well. Sensory and motor exam was unchanged from baseline. The patient received written discharge instructions prior to discharge.    FOLLOW UP:     ADDITIONAL NOTES:     [facility]    Aydin Savage MD    Codes:  [id number]

## 2025-03-26 ENCOUNTER — TELEPHONE (OUTPATIENT)
Dept: PAIN MEDICINE | Facility: CLINIC | Age: 45
End: 2025-03-26

## 2025-03-26 DIAGNOSIS — E27.40 ADRENAL INSUFFICIENCY: ICD-10-CM

## 2025-03-26 NOTE — TELEPHONE ENCOUNTER
Caller: NADINE    Relationship: SELF    Best call back number: [phone]    What orders are you requesting (i.e. lab or imaging): WAS SUPPOSED TO GET NEW TENS UNIT FROM [employer] BUT HASN'T HEARD FROM ANYONE- PLEASE CALL

## 2025-03-27 RX ORDER — HYDROCORTISONE 10 MG/1
TABLET ORAL SEE ADMIN INSTRUCTIONS
Qty: 21 TABLET | Refills: 5 | Status: SHIPPED | OUTPATIENT
Start: 2025-03-27

## 2025-03-27 NOTE — TELEPHONE ENCOUNTER
Rx Refill Note  Requested Prescriptions     Pending Prescriptions Disp Refills    hydrocortisone (CORTEF) 10 MG tablet [Pharmacy Med Name: hydrocortisone 10 mg tablet] 21 tablet 5     Sig: TAKE 2 TABLETS BY MOUTH EVERY MORNING AND 1 TABLET EVERY EVENING      Last office visit with prescribing clinician: 1/22/2025     Next office visit with prescribing clinician: 7/23/2025       Alfredo Renteria MA  03/27/25, 09:46 EDT

## 2025-04-08 ENCOUNTER — PATIENT MESSAGE (OUTPATIENT)
Dept: FAMILY MEDICINE CLINIC | Facility: CLINIC | Age: 45
End: 2025-04-08

## 2025-04-08 ENCOUNTER — OFFICE VISIT (OUTPATIENT)
Dept: FAMILY MEDICINE CLINIC | Facility: CLINIC | Age: 45
End: 2025-04-08
Payer: COMMERCIAL

## 2025-04-08 DIAGNOSIS — R00.0 TACHYCARDIA: ICD-10-CM

## 2025-04-08 DIAGNOSIS — E78.5 DYSLIPIDEMIA: ICD-10-CM

## 2025-04-08 DIAGNOSIS — M06.9 RHEUMATOID ARTHRITIS, INVOLVING UNSPECIFIED SITE, UNSPECIFIED WHETHER RHEUMATOID FACTOR PRESENT: ICD-10-CM

## 2025-04-08 DIAGNOSIS — E27.40 ADRENAL INSUFFICIENCY: ICD-10-CM

## 2025-04-08 DIAGNOSIS — I10 HYPERTENSION, UNSPECIFIED TYPE: ICD-10-CM

## 2025-04-08 DIAGNOSIS — R61 HYPERHIDROSIS: ICD-10-CM

## 2025-04-08 DIAGNOSIS — M79.10 MYALGIA: ICD-10-CM

## 2025-04-08 DIAGNOSIS — M62.81 MUSCLE WEAKNESS (GENERALIZED): ICD-10-CM

## 2025-04-08 DIAGNOSIS — R29.810 FACIAL MUSCLE WEAKNESS: ICD-10-CM

## 2025-04-08 DIAGNOSIS — R53.1 GENERALIZED WEAKNESS: Primary | ICD-10-CM

## 2025-04-08 DIAGNOSIS — D72.829 LEUKOCYTOSIS, UNSPECIFIED TYPE: ICD-10-CM

## 2025-04-08 RX ORDER — VONOPRAZAN FUMARATE 13.36 MG/1
10 TABLET ORAL DAILY
COMMUNITY
Start: 2025-04-07

## 2025-04-08 NOTE — PROGRESS NOTES
Subjective        Chief Complaint  Follow up generalized weakness     Subjective      Amor Avila is a 45 y.o. male who presents today to [facility] for follow up generalized weakness. He has a past medical history of DDD with chronic neck/back pain, muscle spasms, Hypertension, Prostatitis, and Rheumatoid arthritis.    Generalized weakness/fatigue  Generalized muscle weakness  Myalgias  Excessive sweating  Peripheral neuropathy   He has had a several month history of generalized weakness, fatigue, notable symmetrical muscle weakness and myalgias, and excessive sweating of unclear etiology.  He was previously evaluated for these symptoms a few months ago and was found to have adrenal insufficiency and low testosterone.  He was referred to endocrinology and started on hydrocortisone and later on testosterone injections.  Testosterone has been repeated and has normalized, remains on weekly supplementation.  He remains on hydrocortisone 20 mg a.m., 10 mg p.m.    With the above-mentioned correction of low testosterone and starting on hydrocortisone, he still does not feel any better.  He reports he actually feels worse over the last couple weeks.  He reports the sweating is significant.  Occurs during the day with little activity.  He will golf with his friends and his shirt will be drenched while others are dry.  He will break out in a sweat after taking a shower and will have to take another shower in cold water.  He is not on any statin therapy which would be causing myalgias.  No recent suspected viral illness.  No excessive exertion/exercise.    In December 2024 when the symptoms first began, CRP, sed rate, iron profile, TSH, free T4, vitamin B12, vitamin D, HA1C were all normal.  He did have leukocytosis at that time at 11.75 with increased absolute neutrophils and absolute lymphocytes. He had experienced some increased allergy symptoms and congestion around that time. CXR  was negative as well as respiratory PCR panel. EKG showed no concerning abnormalities.  Pituitary MRI was unremarkable.    Dyslipidemia   Cholesterol recently elevated, results listed below. Denies any known family history of premature CAD or strokes. Not currently on any medical therapy.      Hoarseness:   Laryngocele:  Thyroid nodule:  Seasonal and environmental allergies:  Has chronic seasonal and environmental allergies with intermittent flares and hoarseness of his voice. Has chronic raspy voice. No history of smoking cigarettes, but has used chewing tobacco in the past.  He was recently referred to ENT for these complaints.  Laryngoscope reportedly revealed a nodule and subsequent CT scan showed evidence of a laryngocele.  He reports he is currently awaiting an appointment with another specialist to address this.  He was also noted to have a thyroid nodule with subsequent ultrasound performed at his endocrinology appointment.  Nodule measured 1.6 cm on the left thyroid lobe intersecting the isthmus.  No biopsy recommended at this time.  Plans to recheck an ultrasound in 1 year and neck exam in 6 months.    He does report undergoing recent allergy testing with ENT.  He reports that surprisingly he did not have a response to any of the tested triggers.  He also did not have a response to the histamine control however.    DDD of the cervical spine:   DDD of the lumbar spine:  He has a several year history of chronic neck, back, left shoulder pain.  He also has right-sided carpal tunnel syndrome and left-sided cubital tunnel syndrome.  He is followed with orthopedics in the past without surgical recommendations.  MRI C-spine in 2020 showed C5-C6 disc desiccation and broad-based posterior disc bulging causing mild left neuroforaminal and lateral recess stenosis.  C6-C7 broad-based posterior disc bulging causing left neural foraminal and left lateral recess stenosis.  MRI L-spine in 12/2020 showed degenerative disc  changes in the lower 2 lumbar disc spaces.  Osteoarthritis in the facet joints that does mildly narrow the lateral recesses at L4-L5.  He reports having a MRI of the left shoulder within the last year or so with reported stretching of the AC noted. No surgical intervention recommended at that time.  Report not currently available.    Previously had trigger point injections, physical therapy, massage therapy, TENS unit.  Previously tried medical therapy includes Zanaflex, Flexeril, Robaxin, baclofen, meloxicam, Celebrex, duloxetine, Tylenol No. 4, tramadol, gabapentin.  Nothing has ever given lasting relief.     He was referred to pain management at [facility].  Subsequent MRI of the cervical spine showed multilevel degenerative disc disease.  Multiple areas of broad-based disc bulge. Has underwent several trigger point injections with minimal relief of symptoms.     Hypertension:   BP in the office today is 140/88. Not currently on any medical therapy. Reports intermittent fluctuations at home, only checks it when he feels like it's elevated. Does have headaches as noted above. Reports HR runs a little high at baseline. He is concerned for pheochromocytoma.     Rheumatoid arthritis:   Following with rheumatology at [facility].  Previously on Humira. He was transitioned to Xeljanz, however, reports he is now back off of this with plans go go back on Humira.     Has been on methotrexate, Plaquenil in the past. Joints affected are mostly his hands and ankles.      Current Outpatient Medications:     acetaminophen-codeine (TYLENOL #4) 300-60 MG per tablet, Take 1 tablet by mouth Every 4 (Four) Hours As Needed for Moderate Pain., Disp: , Rfl:     albuterol sulfate  (90 Base) MCG/ACT inhaler, , Disp: , Rfl:     budesonide-formoterol (SYMBICORT) 160-4.5 MCG/ACT inhaler, , Disp: , Rfl:     EPINEPHrine (EPIPEN) 0.3 MG/0.3ML solution auto-injector injection, , Disp: , Rfl:     gabapentin (NEURONTIN)  "800 MG tablet, Take 1 tablet by mouth 3 (Three) Times a Day., Disp: , Rfl:     hydrocortisone (CORTEF) 10 MG tablet, TAKE 2 TABLETS BY MOUTH EVERY MORNING AND 1 TABLET EVERY EVENING, Disp: 21 tablet, Rfl: 5    ibuprofen (ADVIL,MOTRIN) 800 MG tablet, Take 1 tablet by mouth Every 6 (Six) Hours As Needed for Mild Pain., Disp: , Rfl:     Testosterone Enanthate (Xyosted) 75 MG/0.5ML solution auto-injector, Inject 75 mg under the skin into the appropriate area as directed 1 (One) Time Per Week., Disp: 2 mL, Rfl: 5    traMADol (ULTRAM) 50 MG tablet, Take 1 tablet by mouth Every 8 (Eight) Hours As Needed for Moderate Pain., Disp: , Rfl:     Voquezna 10 MG tablet, Take 1 tablet by mouth Daily., Disp: , Rfl:       No Known Allergies    Objective     Objective   Vital Signs:  /88   Pulse 104   Temp 97.9 °F (36.6 °C) (Oral)   Ht 185.4 cm (72.99\")   Wt 93.9 kg (207 lb)   SpO2 99%   BMI 27.32 kg/m²   Estimated body mass index is 27.32 kg/m² as calculated from the following:    Height as of this encounter: 185.4 cm (72.99\").    Weight as of this encounter: 93.9 kg (207 lb).    BMI is >= 25 and <30. (Overweight) The following options were offered after discussion;: weight loss educational material (shared in after visit summary)    Past Medical History:   Diagnosis Date    Cervical disc disorder     Chronic pain disorder     Extremity pain     Headache     Hyperlipidemia 12/10/24    Hypertension     Joint pain     Low back pain     Migraine     Neck pain     Peripheral neuropathy     Prostatitis, chronic 08/14/2019    Rheumatoid arthritis     Testosterone deficiency 12/10/24     Past Surgical History:   Procedure Laterality Date    NO PAST SURGERIES      TRIGGER POINT INJECTION       Social History     Socioeconomic History    Marital status:    Tobacco Use    Smoking status: Never     Passive exposure: Never    Smokeless tobacco: Never    Tobacco comments:     Former smokeless tobacco user   Vaping Use    Vaping " status: Never Used   Substance and Sexual Activity    Alcohol use: Yes     Alcohol/week: 2.0 standard drinks of alcohol     Types: 2 Cans of beer per week     Comment: OCC    Drug use: No    Sexual activity: Yes     Partners: Female     Birth control/protection: Vasectomy, OCP      Physical Exam  Vitals and nursing note reviewed.   Constitutional:       General: He is not in acute distress.     Appearance: He is well-developed. He is not diaphoretic.   HENT:      Head: Normocephalic and atraumatic.   Eyes:      General: No scleral icterus.        Right eye: No discharge.         Left eye: No discharge.      Conjunctiva/sclera: Conjunctivae normal.   Cardiovascular:      Rate and Rhythm: Normal rate and regular rhythm.      Heart sounds: Normal heart sounds. No murmur heard.     No friction rub. No gallop.   Pulmonary:      Effort: Pulmonary effort is normal. No respiratory distress.      Breath sounds: Normal breath sounds. No wheezing or rales.   Chest:      Chest wall: No tenderness.   Musculoskeletal:         General: Normal range of motion.      Cervical back: Normal range of motion and neck supple.   Skin:     General: Skin is warm and dry.      Coloration: Skin is not pale.      Findings: No erythema or rash.   Neurological:      Mental Status: He is alert and oriented to person, place, and time.   Psychiatric:         Behavior: Behavior normal.        Result Review :  The following data was reviewed by: LESLIE Bragg on 06/05/2024:  Hemoglobin A1C   Date Value Ref Range Status   12/05/2024 5.30 4.80 - 5.60 % Final     TSH   Date Value Ref Range Status   12/05/2024 0.832 0.270 - 4.200 uIU/mL Final     HDL Cholesterol   Date Value Ref Range Status   12/10/2024 66 (H) 40 - 60 mg/dL Final     LDL Cholesterol    Date Value Ref Range Status   12/10/2024 171 (H) 0 - 100 mg/dL Final     Triglycerides   Date Value Ref Range Status   12/10/2024 117 0 - 150 mg/dL Final                 Assessment / Plan          Assessment   Diagnoses and all orders for this visit:     1. Generalized weakness  2. Muscle weakness (generalized)  3. Myalgia  4. Hyperhidrosis  -Denies improvement in symptoms with normalization of testosterone levels or with hydrocortisone supplementation.  -No repeat lab work since he started hydrocortisone for adrenal insufficiency.  -He will return later this week at 8 AM in a fasting state for repeat and further lab work.  -TB skin test obtained today both for evaluation of diaphoresis and for his rheumatologist as he will be restarting Humira soon.  Will forward results once received. Recent CXR negative.  -Recent EKG performed in the office showed a sinus rhythm without concerning ST/T wave abnormalities.  Cardiac risk factors include hypertension and dyslipidemia.  May consider cardiac workup as well as in-home sleep study pending results.     - Lactate Dehydrogenase; Future  - GERMAINE Comprehensive Panel; Future  - CK; Future  - Aldolase; Future  - TSH; Future  - C-reactive protein; Future  - Sedimentation Rate; Future  - HIV-1 & HIV-2 Antibodies; Future    5. Hypertension, unspecified type  6. Tachycardia  -BP in the office today is elevated.  He reports intermittent elevations at home.  -Recommend routine blood pressure monitoring 3 to 4 days a week or if he feels like it is elevated.  If persistent hypertension, may consider addition of a beta-blocker which would help his baseline elevated heart rate as well as BP.  -Obtain plasma catecholamines and metanephrines.  Will consider 24-hour urine study and adrenal imaging if concerns noted.    - Catecholamines, Fractionated, Plasma; Future  - Comprehensive metabolic panel; Future  - Metanephrines, Frac. Free, Plasma; Future    7. Leukocytosis, unspecified type  -Previously noted to have mildly elevated WBC count, he did have some sinus drainage around that time.  -Obtain repeat CBC along with a peripheral blood smear and LDH given above-mentioned  symptoms.  -It is noted that he is on hydrocortisone and may have a further elevated WBC count second to this.  CRP pending as well.    - CBC w AUTO Differential; Future  - Peripheral Blood Smear; Future  - Lactate Dehydrogenase; Future    8. Dyslipidemia  -Obtain updated lipid panel later this week after >3months of dietary changes.  Will consider statin therapy if restratification recommends.  -Would like to avoid statins at this time given recent muscle weakness to avoid potential side effects which may mask other symptoms of his current issues.     - Lipid panel; Future    9. Rheumatoid arthritis, involving unspecified site, unspecified whether rheumatoid factor present  -Following with rheumatology at [facility].  Currently off of all medications with plans to transition back to Humira.  -TB skin test obtained today and results will be forwarded to his rheumatologist.    - TB Skin Test    10. Adrenal insufficiency  11. Low testosterone in male   12.  Thyroid nodule  -Obtain updated cortisol level now that he has been on the hydrocortisone for a few months.  Patient not feeling any better at this time.  -Planning for 1 year thyroid ultrasound in 6-month neck exam with endocrinology.    - Cortisol - AM; Future    13.  Laryngocele   14.  Persistent hoarseness   -Being evaluated/managed by ENT at [facility].  Planning for specialist appointment to address the laryngocele.  -Some improvement with changing pantoprazole to Voquezna.     15. DDD (degenerative disc disease), cervical  16. Lumbar degenerative disc disease  Continue regular follow-up with pain management at [facility].         No orders of the defined types were placed in this encounter.    Health Maintenance:  Had colonoscopy several years ago with 5 yr repeat recommended 2/2 polyps. He did have the repeat with no concerning findings. 10 yr repeat then recommended. Will try to track down these records.  Consider updating Tdap.     I spent >55 minutes  caring for Amor Avila on this date of service. This time includes time spent by me in the following activities: preparing for the visit, reviewing tests, obtaining and/or reviewing a separately obtained history, performing a medically appropriate examination and/or evaluation , counseling and educating the patient/family/caregiver, ordering medications, tests, or procedures and documenting information in the medical record.     Follow Up   Return in about 3 weeks (around 4/29/2025) for Recheck fatigue, muscle weakness. .    Patient was given instructions and counseling regarding his condition or for health maintenance advice. Please see specific information pulled into the AVS if appropriate.       This document has been electronically signed by LESLIE Bragg   April 9, 2025 09:40 EDT    Dictated Utilizing Dragon Dictation: Part of this note may be an electronic transcription/translation of spoken language to printed text using the Dragon Dictation System.

## 2025-04-08 NOTE — Clinical Note
Had colonoscopy several years ago with 5 yr repeat recommended 2/2 polyps. He did have the repeat with no concerning findings. 10 yr repeat then recommended. Please check with the patient to see where these were completed and try to obtain reports.

## 2025-04-09 VITALS
HEART RATE: 104 BPM | TEMPERATURE: 97.9 F | DIASTOLIC BLOOD PRESSURE: 88 MMHG | HEIGHT: 73 IN | BODY MASS INDEX: 27.43 KG/M2 | WEIGHT: 207 LBS | OXYGEN SATURATION: 99 % | SYSTOLIC BLOOD PRESSURE: 140 MMHG

## 2025-04-09 NOTE — PROGRESS NOTES
Called patient he stated that his two most recent exam were with Dr. Gray. Called and requested records.

## 2025-04-11 ENCOUNTER — LAB (OUTPATIENT)
Dept: FAMILY MEDICINE CLINIC | Facility: CLINIC | Age: 45
End: 2025-04-11
Payer: COMMERCIAL

## 2025-04-11 DIAGNOSIS — M62.81 MUSCLE WEAKNESS (GENERALIZED): ICD-10-CM

## 2025-04-11 DIAGNOSIS — E27.40 ADRENAL INSUFFICIENCY: ICD-10-CM

## 2025-04-11 DIAGNOSIS — R61 HYPERHIDROSIS: ICD-10-CM

## 2025-04-11 DIAGNOSIS — M79.10 MYALGIA: ICD-10-CM

## 2025-04-11 DIAGNOSIS — I10 HYPERTENSION, UNSPECIFIED TYPE: ICD-10-CM

## 2025-04-11 DIAGNOSIS — D72.829 LEUKOCYTOSIS, UNSPECIFIED TYPE: ICD-10-CM

## 2025-04-11 DIAGNOSIS — E78.5 DYSLIPIDEMIA: ICD-10-CM

## 2025-04-11 DIAGNOSIS — R29.810 FACIAL MUSCLE WEAKNESS: ICD-10-CM

## 2025-04-11 DIAGNOSIS — R53.1 GENERALIZED WEAKNESS: ICD-10-CM

## 2025-04-11 LAB
ALBUMIN SERPL-MCNC: 3.7 G/DL (ref 3.5–5.2)
ALBUMIN/GLOB SERPL: 1.4 G/DL
ALP SERPL-CCNC: 85 U/L (ref 39–117)
ALT SERPL W P-5'-P-CCNC: 9 U/L (ref 1–41)
ANION GAP SERPL CALCULATED.3IONS-SCNC: 6.7 MMOL/L (ref 5–15)
AST SERPL-CCNC: 11 U/L (ref 1–40)
BASOPHILS # BLD AUTO: 0.07 10*3/MM3 (ref 0–0.2)
BASOPHILS NFR BLD AUTO: 1.1 % (ref 0–1.5)
BILIRUB SERPL-MCNC: 0.2 MG/DL (ref 0–1.2)
BUN SERPL-MCNC: 12 MG/DL (ref 6–20)
BUN/CREAT SERPL: 10.5 (ref 7–25)
CALCIUM SPEC-SCNC: 8.6 MG/DL (ref 8.6–10.5)
CHLORIDE SERPL-SCNC: 104 MMOL/L (ref 98–107)
CHOLEST SERPL-MCNC: 155 MG/DL (ref 0–200)
CK SERPL-CCNC: 107 U/L (ref 20–200)
CO2 SERPL-SCNC: 25.3 MMOL/L (ref 22–29)
CORTIS AM PEAK SERPL-MCNC: 26.66 MCG/DL
CREAT SERPL-MCNC: 1.14 MG/DL (ref 0.76–1.27)
CRP SERPL-MCNC: 0.73 MG/DL (ref 0–0.5)
DEPRECATED RDW RBC AUTO: 41.6 FL (ref 37–54)
EGFRCR SERPLBLD CKD-EPI 2021: 80.8 ML/MIN/1.73
EOSINOPHIL # BLD AUTO: 0.58 10*3/MM3 (ref 0–0.4)
EOSINOPHIL NFR BLD AUTO: 9 % (ref 0.3–6.2)
ERYTHROCYTE [DISTWIDTH] IN BLOOD BY AUTOMATED COUNT: 12.6 % (ref 12.3–15.4)
ERYTHROCYTE [SEDIMENTATION RATE] IN BLOOD: 8 MM/HR (ref 0–15)
GLOBULIN UR ELPH-MCNC: 2.6 GM/DL
GLUCOSE SERPL-MCNC: 128 MG/DL (ref 65–99)
HCT VFR BLD AUTO: 45.8 % (ref 37.5–51)
HDLC SERPL-MCNC: 36 MG/DL (ref 40–60)
HGB BLD-MCNC: 15.7 G/DL (ref 13–17.7)
HIV 1+2 AB+HIV1 P24 AG SERPL QL IA: NORMAL
IMM GRANULOCYTES # BLD AUTO: 0.01 10*3/MM3 (ref 0–0.05)
IMM GRANULOCYTES NFR BLD AUTO: 0.2 % (ref 0–0.5)
LDH SERPL-CCNC: 211 U/L (ref 135–225)
LDLC SERPL CALC-MCNC: 87 MG/DL (ref 0–100)
LDLC/HDLC SERPL: 2.28 {RATIO}
LYMPHOCYTES # BLD AUTO: 2.49 10*3/MM3 (ref 0.7–3.1)
LYMPHOCYTES NFR BLD AUTO: 38.5 % (ref 19.6–45.3)
MCH RBC QN AUTO: 31.5 PG (ref 26.6–33)
MCHC RBC AUTO-ENTMCNC: 34.3 G/DL (ref 31.5–35.7)
MCV RBC AUTO: 92 FL (ref 79–97)
MONOCYTES # BLD AUTO: 0.67 10*3/MM3 (ref 0.1–0.9)
MONOCYTES NFR BLD AUTO: 10.4 % (ref 5–12)
NEUTROPHILS NFR BLD AUTO: 2.65 10*3/MM3 (ref 1.7–7)
NEUTROPHILS NFR BLD AUTO: 40.8 % (ref 42.7–76)
NRBC BLD AUTO-RTO: 0 /100 WBC (ref 0–0.2)
PATHOLOGY REVIEW: YES
PLATELET # BLD AUTO: 305 10*3/MM3 (ref 140–450)
PMV BLD AUTO: 10.1 FL (ref 6–12)
POTASSIUM SERPL-SCNC: 3.7 MMOL/L (ref 3.5–5.2)
PROT SERPL-MCNC: 6.3 G/DL (ref 6–8.5)
RBC # BLD AUTO: 4.98 10*6/MM3 (ref 4.14–5.8)
SODIUM SERPL-SCNC: 136 MMOL/L (ref 136–145)
TRIGL SERPL-MCNC: 184 MG/DL (ref 0–150)
TSH SERPL DL<=0.05 MIU/L-ACNC: 1.19 UIU/ML (ref 0.27–4.2)
VLDLC SERPL-MCNC: 32 MG/DL (ref 5–40)
WBC NRBC COR # BLD AUTO: 6.47 10*3/MM3 (ref 3.4–10.8)

## 2025-04-11 PROCEDURE — 86255 FLUORESCENT ANTIBODY SCREEN: CPT | Performed by: PHYSICIAN ASSISTANT

## 2025-04-11 PROCEDURE — 86225 DNA ANTIBODY NATIVE: CPT | Performed by: PHYSICIAN ASSISTANT

## 2025-04-11 PROCEDURE — 86041 ACETYLCHOLN RCPTR BNDNG ANTB: CPT | Performed by: PHYSICIAN ASSISTANT

## 2025-04-11 PROCEDURE — 86235 NUCLEAR ANTIGEN ANTIBODY: CPT | Performed by: PHYSICIAN ASSISTANT

## 2025-04-11 PROCEDURE — G0432 EIA HIV-1/HIV-2 SCREEN: HCPCS | Performed by: PHYSICIAN ASSISTANT

## 2025-04-11 PROCEDURE — 86140 C-REACTIVE PROTEIN: CPT | Performed by: PHYSICIAN ASSISTANT

## 2025-04-11 PROCEDURE — 36415 COLL VENOUS BLD VENIPUNCTURE: CPT

## 2025-04-11 PROCEDURE — 82550 ASSAY OF CK (CPK): CPT | Performed by: PHYSICIAN ASSISTANT

## 2025-04-11 PROCEDURE — 82085 ASSAY OF ALDOLASE: CPT | Performed by: PHYSICIAN ASSISTANT

## 2025-04-11 PROCEDURE — 86042 ACETYLCHOLN RCPTR BLCKG ANTB: CPT | Performed by: PHYSICIAN ASSISTANT

## 2025-04-11 PROCEDURE — 83615 LACTATE (LD) (LDH) ENZYME: CPT | Performed by: PHYSICIAN ASSISTANT

## 2025-04-11 PROCEDURE — 80061 LIPID PANEL: CPT | Performed by: PHYSICIAN ASSISTANT

## 2025-04-11 PROCEDURE — 85652 RBC SED RATE AUTOMATED: CPT | Performed by: PHYSICIAN ASSISTANT

## 2025-04-11 PROCEDURE — 80050 GENERAL HEALTH PANEL: CPT | Performed by: PHYSICIAN ASSISTANT

## 2025-04-11 PROCEDURE — 82533 TOTAL CORTISOL: CPT | Performed by: PHYSICIAN ASSISTANT

## 2025-04-11 PROCEDURE — 86043 ACETYLCHOLN RCPTR MODLG ANTB: CPT | Performed by: PHYSICIAN ASSISTANT

## 2025-04-12 LAB
LAB AP CASE REPORT: NORMAL
PATH REPORT.FINAL DX SPEC: NORMAL

## 2025-04-14 ENCOUNTER — RESULTS FOLLOW-UP (OUTPATIENT)
Dept: FAMILY MEDICINE CLINIC | Facility: CLINIC | Age: 45
End: 2025-04-14
Payer: COMMERCIAL

## 2025-04-14 DIAGNOSIS — R79.82 ELEVATED C-REACTIVE PROTEIN (CRP): ICD-10-CM

## 2025-04-14 DIAGNOSIS — R61 GENERALIZED HYPERHIDROSIS: ICD-10-CM

## 2025-04-14 DIAGNOSIS — R76.8 LOW IMMUNOGLOBULIN LEVEL: ICD-10-CM

## 2025-04-14 DIAGNOSIS — D72.10 EOSINOPHILIA, UNSPECIFIED TYPE: Primary | ICD-10-CM

## 2025-04-14 LAB
ALDOLASE SERPL-CCNC: 3.6 U/L (ref 3.3–10.3)
CENTROMERE B AB SER-ACNC: <0.2 AI (ref 0–0.9)
CHROMATIN AB SERPL-ACNC: <0.2 AI (ref 0–0.9)
DSDNA AB SER-ACNC: 1 IU/ML (ref 0–9)
ENA JO1 AB SER-ACNC: <0.2 AI (ref 0–0.9)
ENA RNP AB SER-ACNC: <0.2 AI (ref 0–0.9)
ENA SCL70 AB SER-ACNC: <0.2 AI (ref 0–0.9)
ENA SM AB SER-ACNC: <0.2 AI (ref 0–0.9)
ENA SS-A AB SER-ACNC: <0.2 AI (ref 0–0.9)
ENA SS-B AB SER-ACNC: <0.2 AI (ref 0–0.9)
Lab: NORMAL

## 2025-04-25 LAB
ACHR BIND AB SER-SCNC: <0.07 NMOL/L (ref 0–0.24)
ACHR BLOCK AB SER-ACNC: 18 % (ref 0–25)
ACHR MOD AB SER QL FC: 0 % (ref 0–45)
MUSK AB SER IA-ACNC: <1 U/ML
REFLEX INFORMATION: NORMAL
STRIA MUS AB TITR SER IF: NEGATIVE {TITER}

## 2025-04-29 ENCOUNTER — OFFICE VISIT (OUTPATIENT)
Dept: FAMILY MEDICINE CLINIC | Facility: CLINIC | Age: 45
End: 2025-04-29
Payer: COMMERCIAL

## 2025-04-29 VITALS
OXYGEN SATURATION: 99 % | HEIGHT: 73 IN | SYSTOLIC BLOOD PRESSURE: 140 MMHG | DIASTOLIC BLOOD PRESSURE: 84 MMHG | HEART RATE: 94 BPM | WEIGHT: 208 LBS | TEMPERATURE: 97.2 F | BODY MASS INDEX: 27.57 KG/M2

## 2025-04-29 DIAGNOSIS — M62.81 MUSCLE WEAKNESS (GENERALIZED): ICD-10-CM

## 2025-04-29 DIAGNOSIS — E78.5 DYSLIPIDEMIA: ICD-10-CM

## 2025-04-29 DIAGNOSIS — R61 HYPERHIDROSIS: ICD-10-CM

## 2025-04-29 DIAGNOSIS — R00.0 TACHYCARDIA: ICD-10-CM

## 2025-04-29 DIAGNOSIS — R76.8 LOW IMMUNOGLOBULIN LEVEL: ICD-10-CM

## 2025-04-29 DIAGNOSIS — D72.10 EOSINOPHILIA, UNSPECIFIED TYPE: ICD-10-CM

## 2025-04-29 DIAGNOSIS — M79.10 MYALGIA: ICD-10-CM

## 2025-04-29 DIAGNOSIS — I10 PRIMARY HYPERTENSION: ICD-10-CM

## 2025-04-29 DIAGNOSIS — R53.1 GENERALIZED WEAKNESS: Primary | ICD-10-CM

## 2025-04-29 NOTE — Clinical Note
Please see if it would be possible to move his CT scans up any sooner. Said he can pretty much come any time.

## 2025-04-29 NOTE — PROGRESS NOTES
Subjective        Chief Complaint  Follow up generalized weakness, excessive sweating.     Subjective      Amor Avila is a 45 y.o. male who presents today to [facility] for follow up generalized weakness. He has a past medical history of DDD with chronic neck/back pain, muscle spasms, Hypertension, Prostatitis, and Rheumatoid arthritis.    Generalized weakness/fatigue  Generalized muscle weakness  Myalgias  Excessive sweating  Peripheral neuropathy   He has had a several month history of generalized weakness, fatigue, notable symmetrical muscle weakness and myalgias, and excessive sweating of unclear etiology.  He was previously evaluated for these symptoms a few months ago and was found to have adrenal insufficiency and low testosterone.  He was referred to endocrinology and started on hydrocortisone and later on testosterone injections.  Testosterone has been repeated and has normalized, remains on weekly supplementation.  He remains on hydrocortisone 20 mg a.m., 10 mg p.m.    With the above-mentioned correction of low testosterone and starting on hydrocortisone, he still does not feel any better. He reports the sweating is significant.  Occurs during the day with little activity.  He will golf with his friends and his shirt will be drenched while others are dry.  He will break out in a sweat after taking a shower and will have to take another shower in cold water.  He is not on any statin therapy which would be causing myalgias.  No recent suspected viral illness.  No excessive exertion/exercise. Occur at day or night. No associated chest pains, palpitations, or shortness of breath. The sweating episodes will occur after eating at times. Has not appreciated this with any specific foods. Reports prior testing for alpha gal being negative.     In December 2024 when the symptoms first began, CRP, sed rate, iron profile, TSH, free T4, vitamin B12, vitamin D, HA1C were all  normal.  He did have leukocytosis at that time at 11.75 with increased absolute neutrophils and absolute lymphocytes. He had experienced some increased allergy symptoms and congestion around that time. CXR was negative as well as respiratory PCR panel. EKG showed no concerning abnormalities.  Pituitary MRI was unremarkable.    Most recently, GERMAINE comprehensive panel, TSH, CK, aldolase, TB skin test, HIV antibodies, LDH, ESR, LFTs, and myasthenia gravis panel were all normal. TB skin test normal. He had a mildly elevated CRP at 0.73.  Minimal elevation in absolute eosinophils with peripheral blood smear showing mildly elevated eosinophils.  Total white blood cell count was normal.  CT scan of the chest, abdomen, pelvis are pending, but are not scheduled until the towards the end of next month. Has tried weaning down on the tramadol without improvement in sweating as it can be a side effect.    Dyslipidemia   Denies any known family history of premature CAD or strokes. Not currently on any medical therapy. Recent repeat FLP with dietary changes showed significant improvement.          Hoarseness:   Laryngocele:  Thyroid nodule:  Seasonal and environmental allergies:   Has chronic seasonal and environmental allergies with intermittent flares and hoarseness of his voice. Has chronic raspy voice. No history of smoking cigarettes, but has used chewing tobacco in the past.  He was referred to ENT for these complaints. Has evidence of a laryngocele on exam and CT, CT was a limited study. Planning for excision by Dr. Geovanna Sawyer on 5/20 as of now. He does still have some questions regarding the surgery and plans to may a sooner follow up if possible.     He was also noted to have a thyroid nodule with subsequent ultrasound performed at his endocrinology appointment.  Nodule measured 1.6 cm on the left thyroid lobe intersecting the isthmus.  No biopsy recommended at this time.  Plans to recheck an ultrasound in 1 year and  neck exam in 6 months.    He does report undergoing recent allergy testing with ENT.  He reports that surprisingly he did not have a response to any of the tested triggers.  He also did not have a response to the histamine control however. He has had low or borderline low immunoglobulin levels on separate occasions.     DDD of the cervical spine:   DDD of the lumbar spine:  He has a several year history of chronic neck, back, left shoulder pain.  He also has right-sided carpal tunnel syndrome and left-sided cubital tunnel syndrome.  He is followed with orthopedics in the past without surgical recommendations.  MRI C-spine in 2020 showed C5-C6 disc desiccation and broad-based posterior disc bulging causing mild left neuroforaminal and lateral recess stenosis.  C6-C7 broad-based posterior disc bulging causing left neural foraminal and left lateral recess stenosis.  MRI L-spine in 12/2020 showed degenerative disc changes in the lower 2 lumbar disc spaces.  Osteoarthritis in the facet joints that does mildly narrow the lateral recesses at L4-L5.  He reports having a MRI of the left shoulder within the last year or so with reported stretching of the AC noted. No surgical intervention recommended at that time.  Report not currently available.    Previously had trigger point injections, physical therapy, massage therapy, TENS unit.  Previously tried medical therapy includes Zanaflex, Flexeril, Robaxin, baclofen, meloxicam, Celebrex, duloxetine, Tylenol No. 4, tramadol, gabapentin.  Nothing has ever given lasting relief.     He was referred to pain management at [facility].  Subsequent MRI of the cervical spine showed multilevel degenerative disc disease.  Multiple areas of broad-based disc bulge. Has underwent several trigger point injections with minimal relief of symptoms. Updated EMG/NCV pending next month.     Hypertension:   BP in the office today is 140/84. Not currently on any medical therapy. Reports  "intermittent fluctuations at home, only checks it when he feels like it's elevated. Does have headaches as noted above. Reports HR runs a little high at baseline. He is concerned for pheochromocytoma.     Rheumatoid arthritis:   Following with rheumatology at [facility].  Previously on Humira. He was transitioned to Xeljanz, however, reports he is now back off of this with plans go go back on Humira.     Has been on methotrexate, Plaquenil in the past. Joints affected are mostly his hands and ankles.      Current Outpatient Medications:     acetaminophen-codeine (TYLENOL #4) 300-60 MG per tablet, Take 1 tablet by mouth Every 4 (Four) Hours As Needed for Moderate Pain., Disp: , Rfl:     albuterol sulfate  (90 Base) MCG/ACT inhaler, , Disp: , Rfl:     budesonide-formoterol (SYMBICORT) 160-4.5 MCG/ACT inhaler, , Disp: , Rfl:     EPINEPHrine (EPIPEN) 0.3 MG/0.3ML solution auto-injector injection, , Disp: , Rfl:     gabapentin (NEURONTIN) 800 MG tablet, Take 1 tablet by mouth 3 (Three) Times a Day., Disp: , Rfl:     hydrocortisone (CORTEF) 10 MG tablet, TAKE 2 TABLETS BY MOUTH EVERY MORNING AND 1 TABLET EVERY EVENING, Disp: 21 tablet, Rfl: 5    ibuprofen (ADVIL,MOTRIN) 800 MG tablet, Take 1 tablet by mouth Every 6 (Six) Hours As Needed for Mild Pain., Disp: , Rfl:     Testosterone Enanthate (Xyosted) 75 MG/0.5ML solution auto-injector, Inject 75 mg under the skin into the appropriate area as directed 1 (One) Time Per Week., Disp: 2 mL, Rfl: 5    traMADol (ULTRAM) 50 MG tablet, Take 1 tablet by mouth Every 8 (Eight) Hours As Needed for Moderate Pain., Disp: , Rfl:     Voquezna 10 MG tablet, Take 1 tablet by mouth Daily., Disp: , Rfl:       No Known Allergies    Objective     Objective   Vital Signs:  /84   Pulse 94   Temp 97.2 °F (36.2 °C) (Oral)   Ht 185.4 cm (72.99\")   Wt 94.3 kg (208 lb)   SpO2 99%   BMI 27.45 kg/m²   Estimated body mass index is 27.45 kg/m² as calculated from the following:    " "Height as of this encounter: 185.4 cm (72.99\").    Weight as of this encounter: 94.3 kg (208 lb).    BMI is >= 25 and <30. (Overweight) The following options were offered after discussion;: weight loss educational material (shared in after visit summary)    Past Medical History:   Diagnosis Date    Cervical disc disorder     Chronic pain disorder     Extremity pain     Headache     Hyperlipidemia 12/10/24    Hypertension     Joint pain     Low back pain     Migraine     Neck pain     Peripheral neuropathy     Prostatitis, chronic 08/14/2019    Rheumatoid arthritis     Testosterone deficiency 12/10/24     Past Surgical History:   Procedure Laterality Date    NO PAST SURGERIES      TRIGGER POINT INJECTION       Social History     Socioeconomic History    Marital status:    Tobacco Use    Smoking status: Never     Passive exposure: Never    Smokeless tobacco: Never    Tobacco comments:     Former smokeless tobacco user   Vaping Use    Vaping status: Never Used   Substance and Sexual Activity    Alcohol use: Yes     Alcohol/week: 2.0 standard drinks of alcohol     Types: 2 Cans of beer per week     Comment: OCC    Drug use: No    Sexual activity: Yes     Partners: Female     Birth control/protection: Vasectomy, OCP      Physical Exam  Vitals and nursing note reviewed.   Constitutional:       General: He is not in acute distress.     Appearance: He is well-developed. He is not diaphoretic.   HENT:      Head: Normocephalic and atraumatic.   Eyes:      General: No scleral icterus.        Right eye: No discharge.         Left eye: No discharge.      Conjunctiva/sclera: Conjunctivae normal.   Cardiovascular:      Rate and Rhythm: Normal rate and regular rhythm.      Heart sounds: Normal heart sounds. No murmur heard.     No friction rub. No gallop.   Pulmonary:      Effort: Pulmonary effort is normal. No respiratory distress.      Breath sounds: Normal breath sounds. No wheezing or rales.   Chest:      Chest wall: No " tenderness.   Musculoskeletal:         General: Normal range of motion.      Cervical back: Normal range of motion and neck supple.   Skin:     General: Skin is warm and dry.      Coloration: Skin is not pale.      Findings: No erythema or rash.   Neurological:      Mental Status: He is alert and oriented to person, place, and time.   Psychiatric:         Behavior: Behavior normal.        Result Review :  The following data was reviewed by: LESLIE Bragg on 06/05/2024:  Hemoglobin A1C   Date Value Ref Range Status   12/05/2024 5.30 4.80 - 5.60 % Final     TSH   Date Value Ref Range Status   04/11/2025 1.190 0.270 - 4.200 uIU/mL Final     HDL Cholesterol   Date Value Ref Range Status   04/11/2025 36 (L) 40 - 60 mg/dL Final     LDL Cholesterol    Date Value Ref Range Status   04/11/2025 87 0 - 100 mg/dL Final     Triglycerides   Date Value Ref Range Status   04/11/2025 184 (H) 0 - 150 mg/dL Final                 Assessment / Plan         Assessment   Diagnoses and all orders for this visit:     1. Generalized weakness  2. Muscle weakness (generalized)  3. Myalgia  4. Hyperhidrosis  -Denies improvement in symptoms with normalization of testosterone levels or with hydrocortisone supplementation.  -No improvement with tapering dose of tramadol.   -Reports multiple negative sleep studies.   -Reports negative tick borne illness testing and negative alpha gal testing.   -Repeat laboratory testing discussed with the patient.  No acute concerning findings. GERMAINE comprehensive panel, TSH, CK, aldolase, TB skin test, HIV antibodies, LDH, ESR, LFTs, and myasthenia gravis panel were all normal. TB skin test normal. He had a mildly elevated CRP at 0.73.  Minimal elevation in absolute eosinophils with peripheral blood smear showing mildly elevated eosinophils.  Total white blood cell count was normal.  - CT abdomen/pelvis, and chest with contrast pending. Will see if we can get the dates moved up if possible.  -Blood cultures  x 2 as well as repeat CBC and CRP pending.  He will get these when he goes to get his CT scans as we do not generally draw blood cultures here.  - He was sent with jugs for 24-hour urine catecholamines plus VMA and metanephrines as well as 5 HIAA.  - Other than diaphoresis, he denies any cardiac symptoms.  No chest pain, shortness of breath.  No family history of premature coronary artery disease.  He reports having a remote stress test that was negative.  He reports having an echocardiogram earlier this year which was felt to be normal.  This was performed by a family member who is an ultrasound tech and they did not have an actual report generated.  EKG recently was considered normal.  We did discuss that if no etiology is noted with current planned workup, may consider repeating his stress test and ultrasound. He is to monitor closely for any cardiac symptoms for now. Cardiac risk factors include hypertension and dyslipidemia.  -Updated EMG/NCS pending next month with pain management.    5. Primary hypertension   6. Tachycardia  -BP in the office today is mildly elevated at 140/84.  He reports intermittent elevations at home.  -Recommend routine blood pressure monitoring 3 to 4 days a week or if he feels like it is elevated.  If persistent hypertension, may consider addition of a beta-blocker which would help his baseline elevated heart rate as well as BP.  -24-hour urine studies pending as well as adrenal imaging.     7.  Low immunoglobulin levels  8.  Eosinophilia, unspecified type  - On multiple occasions has had low or borderline low immunoglobulin levels.  - Also has chronic fairly significant allergy symptoms with recent allergy testing being negative along with the control test being negative.  - We discussed a second opinion on his allergy evaluation.  Recommended seeing an immunologist which may be able to also look into why his immunoglobulin levels are deficient or borderline deficient. He is  agreeable, referral placed for Dr. Lew in [place].     9. Dyslipidemia  -Would like to avoid statins at this time given recent muscle weakness to avoid potential side effects which may mask other symptoms of his current issues.   - Recent FLP showed significant improvement in lipids with dietary changes alone.  Continue for now.    10. Rheumatoid arthritis, involving unspecified site, unspecified whether rheumatoid factor present  -Following with rheumatology at [facility].  Currently off of all medications with plans to transition back to Humira.  -TB skin test negative.     11. Adrenal insufficiency  12. Low testosterone in male   13.  Thyroid nodule  -Continue hydrocortisone and testosterone supplementation per endocrinology recommendations.  -Planning for 1 year thyroid ultrasound in 6-month neck exam with endocrinology.    13.  Laryngocele   14.  Persistent hoarseness   -Following with ENT at [facility] and Dr. Geovanna Sawyer at . Planning for surgical excision of the laryngocele in about 3 weeks. Patient has concerns about surgery and indication. Encouraged him on making a sooner follow up or tele-visit to discuss his concerns prior to surgery.   -Some improvement in hoarseness with changing pantoprazole to Voquezna.     15. DDD (degenerative disc disease), cervical  16. Lumbar degenerative disc disease  Continue regular follow-up with pain management at [facility].  EMG/NCS pending next month.          No orders of the defined types were placed in this encounter.    Health Maintenance:  Had colonoscopy several years ago with 5 yr repeat recommended 2/2 polyps. He did have the repeat with no concerning findings. 10 yr repeat then recommended. Records requested again as they have still not been received.   Consider updating Tdap.     I spent 50 minutes caring for Amor Avila on this date of service. This time includes time spent by me in the following activities: preparing for the visit, reviewing  tests, obtaining and/or reviewing a separately obtained history, performing a medically appropriate examination and/or evaluation , counseling and educating the patient/family/caregiver, ordering medications, tests, or procedures and documenting information in the medical record.     Follow Up   Return in about 3 weeks (around 5/20/2025) for Recheck.    Patient was given instructions and counseling regarding his condition or for health maintenance advice. Please see specific information pulled into the AVS if appropriate.       This document has been electronically signed by LESLIE Bragg   April 29, 2025 15:44 EDT    Dictated Utilizing Dragon Dictation: Part of this note may be an electronic transcription/translation of spoken language to printed text using the Dragon Dictation System.

## 2025-05-06 ENCOUNTER — TELEPHONE (OUTPATIENT)
Dept: FAMILY MEDICINE CLINIC | Facility: CLINIC | Age: 45
End: 2025-05-06
Payer: COMMERCIAL

## 2025-05-06 NOTE — PROGRESS NOTES
Patient's CT chest and abdomen/pelvis were changed to stat as we were unable to get them any sooner than 5/21/2025.  Due to the severity of the patient's symptoms, it is felt that these were needed sooner.  New orders placed for stat CT chest/abdomen/pelvis and these went into peer to peer review with his insurance company.  After 21 minutes on the phone, insurance company has approved the scans.  They are approved through Sandy 3, 2025.  Approval number is [id number].      This document has been electronically signed by LESLIE Bragg   May 6, 2025 11:48 EDT    Please note that portions of this note were completed with a voice recognition program. Efforts were made to edit dictation, but occasionally words are mistranscribed.

## 2025-05-06 NOTE — TELEPHONE ENCOUNTER
Called and spoke with [facility] scheduling, Mikey RAPP   to arrange patient for STAT CT scans. Patient was scheduled for 5/8/25 @ 7:45 am at the [facility].     Called and made patient aware of appointment and to be NPO for 6 hours

## 2025-05-08 ENCOUNTER — HOSPITAL ENCOUNTER (OUTPATIENT)
Dept: CT IMAGING | Facility: HOSPITAL | Age: 45
Discharge: HOME | End: 2025-05-08
Payer: COMMERCIAL

## 2025-05-08 DIAGNOSIS — R53.1 GENERALIZED WEAKNESS: ICD-10-CM

## 2025-05-08 DIAGNOSIS — D72.10 EOSINOPHILIA, UNSPECIFIED TYPE: ICD-10-CM

## 2025-05-08 DIAGNOSIS — R61 HYPERHIDROSIS: ICD-10-CM

## 2025-05-08 PROCEDURE — 74177 CT ABD & PELVIS W/CONTRAST: CPT

## 2025-05-08 PROCEDURE — 71260 CT THORAX DX C+: CPT

## 2025-05-08 PROCEDURE — 71260 CT THORAX DX C+: CPT | Performed by: RADIOLOGY

## 2025-05-08 PROCEDURE — 25510000001 IOPAMIDOL 61 % SOLUTION: Performed by: PHYSICIAN ASSISTANT

## 2025-05-08 RX ORDER — IOPAMIDOL 612 MG/ML
100 INJECTION, SOLUTION INTRAVASCULAR
Status: COMPLETED | OUTPATIENT
Start: 2025-05-08 | End: 2025-05-08

## 2025-05-08 RX ADMIN — IOPAMIDOL 100 ML: 612 INJECTION, SOLUTION INTRAVENOUS at 08:20

## 2025-07-02 DIAGNOSIS — E23.0 HYPOGONADOTROPIC HYPOGONADISM: ICD-10-CM

## 2025-07-03 ENCOUNTER — OFFICE VISIT (OUTPATIENT)
Age: 45
End: 2025-07-03
Payer: COMMERCIAL

## 2025-07-03 ENCOUNTER — TELEPHONE (OUTPATIENT)
Dept: PAIN MEDICINE | Facility: CLINIC | Age: 45
End: 2025-07-03
Payer: COMMERCIAL

## 2025-07-03 VITALS — HEIGHT: 73 IN | BODY MASS INDEX: 27.17 KG/M2 | WEIGHT: 205 LBS

## 2025-07-03 DIAGNOSIS — M48.062 SPINAL STENOSIS OF LUMBAR REGION WITH NEUROGENIC CLAUDICATION: ICD-10-CM

## 2025-07-03 DIAGNOSIS — R20.0 NUMBNESS AND TINGLING OF HAND: ICD-10-CM

## 2025-07-03 DIAGNOSIS — R20.2 NUMBNESS AND TINGLING OF HAND: ICD-10-CM

## 2025-07-03 DIAGNOSIS — M54.12 CERVICAL RADICULOPATHY: ICD-10-CM

## 2025-07-03 DIAGNOSIS — M48.061 SPINAL STENOSIS OF LUMBAR REGION, UNSPECIFIED WHETHER NEUROGENIC CLAUDICATION PRESENT: ICD-10-CM

## 2025-07-03 DIAGNOSIS — M47.817 LUMBOSACRAL SPONDYLOSIS WITHOUT MYELOPATHY: ICD-10-CM

## 2025-07-03 DIAGNOSIS — M54.2 NECK PAIN: Primary | ICD-10-CM

## 2025-07-03 DIAGNOSIS — M47.812 CERVICAL SPONDYLOSIS WITHOUT MYELOPATHY: ICD-10-CM

## 2025-07-03 PROCEDURE — 99214 OFFICE O/P EST MOD 30 MIN: CPT

## 2025-07-03 RX ORDER — TESTOSTERONE ENANTHATE 75 MG/.5ML
INJECTION SUBCUTANEOUS
Qty: 2 ML | Refills: 5 | Status: SHIPPED | OUTPATIENT
Start: 2025-07-03

## 2025-07-03 RX ORDER — LEVOCETIRIZINE DIHYDROCHLORIDE 5 MG/1
5 TABLET, FILM COATED ORAL EVERY EVENING
COMMUNITY

## 2025-07-03 RX ORDER — ADALIMUMAB 40MG/0.4ML
KIT SUBCUTANEOUS
COMMUNITY
Start: 2025-06-23

## 2025-07-03 NOTE — TELEPHONE ENCOUNTER
"Caller: Amor Avila \"Farzad\"    Relationship to patient: Self    Best call back number: [phone]    Chief complaint: LUMBAR     Type of visit: OUTSIDE FACILITY -7/22/2025    Additional notes:PATIENT WOULD LIKE TO CANCEL, AND NO LONGER WANT TO MOVE FORWARD WITH US DUE TO NOT FEELING LIKE HE'S GETTING THE CARE HE NEEDS. AND WILL BE GOING ELSEWHERE.            "

## 2025-07-03 NOTE — PROGRESS NOTES
Referring Physician: Misa Barragan PA  [address] [place] [id number]    Primary Physician: Misa Barragan PA    CHIEF COMPLAINT or REASON FOR VISIT: Neck Pain and Follow-up      Initial history of present illness on 06/26/2024:  Mr. Amor Avila is 45 y.o. male who presents as a new patient referral for evaluation treatment of chronic neck pain with radiation to bilateral upper extremities and hands.  On his right hand this radiates into his thumb index and long finger, in his left hand this radiates into the pinky and ring fingers.  He has been evaluated in the past for this issue by Dr. Flores, neurosurgery, did not identify any surgical intervention necessary at the time.  He does also have bilateral peripheral neuropathy in his feet.  He has been evaluated by rheumatology in the past, was initially diagnosed with rheumatoid arthritis and given Humira which was not helpful and subsequently had further testing which removed the diagnosis of rheumatoid arthritis.  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  He has never had any injection or intervention.  Patient denies any bowel or bladder dysfunction, lower extremity weakness, new onset saddle anesthesia or unexplained weight loss.   Additionally describes increasing bilateral posterior thigh heaviness and weakness with ambulation and standing.    Interval history:  Patient returns to clinic today after undergoing cervical and thoracic trigger point steroid injections.  He reports good relief for approximately 1 week.  He continues to complain of  diffuse chronic neck pain and muscle tightness.  He reports occasional radiation to the upper extremities.  Additionally, he does notice numbness and tingling within the hands bilaterally.  He has noticed a return in his back pain over the last month without any inciting injury or event.  Pain typically radiates to the bilateral posterior thighs.  Patient denies  any bowel or bladder dysfunction, lower extremity weakness, new onset saddle anesthesia or unexplained weight loss  An EMG/NCV of BUE was ordered however this never took place.      Interventions:  8/13/2024: JEAN with excellent relief for 2 days.  9/19/2024: LESI with 75-80% relief for 9 months   11/26/2024: C3/4 and C4/5 MBB with 15% relief  2/18/2025: Bilateral cervical and thoracic trigger point steroid injections with good relief for 1 week    Objective Pain Scoring:   BRIEF PAIN INVENTORY:  Total score:   Pain Score    07/03/25 0900   PainSc: 8    PainLoc: Neck          PHQ-2:    PHQ-9:    Opioid Risk Tool:         Review of Systems:   ROS negative except as otherwise noted     Past Medical History:   Past Medical History:   Diagnosis Date   • Allergic 2018   • Benign prostatic hyperplasia Unknown ppppt   • Cervical disc disorder    • Chronic pain disorder    • CTS (carpal tunnel syndrome)    • Epididymitis Unknown   • Extremity pain    • Headache    • Hyperlipidemia 12/10/24   • Hypertension    • Joint pain    • Low back pain    • Low back strain    • Migraine    • Neck pain    • Neck strain    • Peripheral neuropathy    • Prostatitis, chronic 08/14/2019   • Rheumatoid arthritis    • Rotator cuff syndrome    • Testosterone deficiency 12/10/24   • Thoracic disc disorder          Past Surgical History:   Past Surgical History:   Procedure Laterality Date   • COLONOSCOPY  2022   • CYSTOSCOPY  August 2023   • NO PAST SURGERIES     • TRIGGER POINT INJECTION     • VASECTOMY  September 19, 2023         Family History   Family History   Problem Relation Age of Onset   • Cancer Mother         Leukemia   • Early death Mother         Age 32   • Osteoarthritis Father    • Hypertension Father    • Cancer Father         Melanoma   • Cancer Maternal Grandmother         Thyroid   • Cancer Maternal Grandfather         Lung   • Cancer Paternal Grandmother         Pancreatic         Social History   Social History      Socioeconomic History   • Marital status:    Tobacco Use   • Smoking status: Never     Passive exposure: Never   • Smokeless tobacco: Never   • Tobacco comments:     Former smokeless tobacco user   Vaping Use   • Vaping status: Never Used   Substance and Sexual Activity   • Alcohol use: Yes     Alcohol/week: 2.0 standard drinks of alcohol     Types: 2 Cans of beer per week     Comment: OCC   • Drug use: No   • Sexual activity: Yes     Partners: Female     Birth control/protection: Vasectomy, OCP        Medications:     Current Outpatient Medications:   •  acetaminophen-codeine (TYLENOL #4) 300-60 MG per tablet, Take 1 tablet by mouth Every 4 (Four) Hours As Needed for Moderate Pain., Disp: , Rfl:   •  albuterol sulfate  (90 Base) MCG/ACT inhaler, , Disp: , Rfl:   •  budesonide-formoterol (SYMBICORT) 160-4.5 MCG/ACT inhaler, , Disp: , Rfl:   •  EPINEPHrine (EPIPEN) 0.3 MG/0.3ML solution auto-injector injection, , Disp: , Rfl:   •  gabapentin (NEURONTIN) 800 MG tablet, Take 1 tablet by mouth 3 (Three) Times a Day., Disp: , Rfl:   •  Humira, 2 Pen, 40 MG/0.4ML Auto-injector Kit, , Disp: , Rfl:   •  hydrocortisone (CORTEF) 10 MG tablet, TAKE 2 TABLETS BY MOUTH EVERY MORNING AND 1 TABLET EVERY EVENING, Disp: 21 tablet, Rfl: 5  •  ibuprofen (ADVIL,MOTRIN) 800 MG tablet, Take 1 tablet by mouth Every 6 (Six) Hours As Needed for Mild Pain., Disp: , Rfl:   •  levocetirizine (XYZAL) 5 MG tablet, Take 1 tablet by mouth Every Evening., Disp: , Rfl:   •  Testosterone Enanthate (Xyosted) 75 MG/0.5ML solution auto-injector, Inject 75 mg under the skin into the appropriate area as directed 1 (One) Time Per Week., Disp: 2 mL, Rfl: 5  •  Voquezna 10 MG tablet, Take 1 tablet by mouth Daily., Disp: , Rfl:   •  traMADol (ULTRAM) 50 MG tablet, Take 1 tablet by mouth Every 8 (Eight) Hours As Needed for Moderate Pain. (Patient not taking: Reported on 7/3/2025), Disp: , Rfl:         Physical Exam:     Vitals:    07/03/25  "0900   Weight: 93 kg (205 lb)   Height: 185.4 cm (72.99\")   PainSc: 8    PainLoc: Neck          General: Alert and oriented, No acute distress.   HEENT: Normocephalic, atraumatic.   Cardiovascular: No gross edema  Respiratory: Respirations are non-labored    Cervical Spine:   No masses or atrophy  Range of motion - Flexion normal. Extension normal. Lateral rotation normal.   Palpation - nontender   Spurling's -positive bilaterally  Paraspinal musculature tender to palpation    Thoracic Spine:   Inspection: no gross abnormality  Paraspinal muscle palpation: Tender bilaterally  Spinous process palpation: nontender    Lumbar Spine:   No masses or atrophy  Range of motion - Flexion normal. Extension normal.    Facet Loading: Negative bilaterally  Facet Palpation - Nontender   Jeffrey finger/Gaenslen's/Gopi's/EBONY/Thigh thrust -   Straight leg raise/slump test: Negative bilaterally      Motor Exam:    Strength: Rate on 1-5 scale Right Left    C5-Elbow flexion, Deltoid 5/5  5/5    C6-Wrist extension 5/5  5/5    C7- Elbow / finger extension 5/5  5/5    C8- Finger flexion 5/5  5/5    T1- Intrinsics hand 5/5  5/5    Strength: Rate on 1-5 scale Right Left    L1/2- hip flexion 5/5  5/5    L3- knee extension 5/5  5/5    L4- ankle dorsiflexion 5/5  5/5    L5- great toe extension 5/5  5/5    S1- ankle plantarflexion 5/5  5/5    Sensory Exam: Altered sensation right C5-6 dermatome, left C8 dermatome  Bilateral shoulder exams:  Guzman Obi negative  Empty can negative  AC cross body test negative  Neer's test negative      Neurologic: Cranial Nerves II-XII are grossly intact.   Bailey’s -negative bilaterally     Psychiatric: Cooperative.   Gait: Normal   Assistive Devices: None    Imaging Studies:   Results for orders placed during the hospital encounter of 12/16/20    MRI Lumbar Spine Without Contrast    Narrative  MRI LUMBAR SPINE WITHOUT CONTRAST-    REASON FOR EXAM:  Low back pain, > 6 wks; G56.03-Carpal " tunnel syndrome,  bilateral upper limbs; M51.36-Other intervertebral disc degeneration,  lumbar region    TECHNIQUE: In the sagittal and axial imaging planes T1 and T2 weighted  sequences were acquired through the lumbar spine.  The axial sequences  were acquired through the lumbar disc spaces.    MRI FINDINGS : The lumbar vertebral bodies are normal in height and  alignment. The vertebral bodies show normal marrow signal. The upper  lumbar discs were well maintained in height and show bright signal  intensity. There is loss of disc height and disc desiccation at L4-L5  and L5-S1. In the axial plane at L4-L5, there is mild facet joint  arthritis slightly narrowing the lateral recesses. There is mild  broad-based disc bulging as well. At L5-S1, the spinal canal was  maintained in contour. There is mild facet joint arthritis as well.    Impression  Degenerative disc changes in the lower 2 lumbar disc spaces.  There is osteoarthritis in the facet joints that does mildly narrow the  lateral recesses at L4-L5.    This report was finalized on 12/16/2020 10:32 AM by Dr. Bi Jenkins II, MD.      PROCEDURE: MRI CERVICAL SPINE WO CONTRAST-     HISTORY: Cervical radiculopathy; M54.12-Radiculopathy, cervical region     COMPARISON: None.     TECHNIQUE: Multiplanar multisequence imaging of the cervical spine was  performed without intravenous contrast.     FINDINGS: The cervical vertebral bodies maintain a normal height,  alignment and signal intensity. The posterior elements are intact.     At the C3/4 level there is a broad-based disc bulge, however there is no  significant spinal stenosis or neural foraminal narrowing.     At the C4/5 level there is a broad-based disc bulge and mild left  greater than right facet arthropathy, however there is no significant  spinal stenosis or neural foraminal narrowing.     At the C5/6 level there is a posterior disc osteophyte complex and facet  arthropathy which produces mild bilateral  neural foraminal narrowing.  There is no significant spinal stenosis.     At the C6/7 level there is a left paracentral disc protrusion which  produces mild spinal stenosis and moderate left neural foraminal  narrowing.     The cervical portions of the spinal cord maintains a normal caliber and  signal intensity. The visualized posterior fossa is normal. The  paraspinal soft tissues are unremarkable.     IMPRESSION:  Multilevel degenerative changes as detailed above.        This report was finalized on 7/20/2024 11:09 AM by Yannick Quinn M.D..    PROCEDURE: MRI LUMBAR SPINE WO CONTRAST-     HISTORY: Spinal stenosis of lumbar region with neurogenic claudication;  M48.061-Spinal stenosis, lumbar region without neurogenic claudication     COMPARISON: None.     TECHNIQUE: Multiplanar multisequence imaging of the lumbar spine was  performed without intravenous contrast.     FINDINGS: The lumbar vertebral bodies maintain a normal height,  alignment and signal intensity. The posterior elements are intact  throughout.     At the L4/5 level there is a broad-based disc bulge with an outer  annular tear. This combined with facet arthropathy produces mild spinal  stenosis and mild bilateral neural foraminal narrowing.     At the L5/S1 level there is a broad-based disc bulge and facet  arthropathy without significant spinal stenosis or neural foraminal  narrowing.     The spinal cord terminates at the T12 level. No conus lesions are  present. The paraspinal soft tissues are unremarkable.     IMPRESSION:  Degenerative change in the lower lumbar spine as detailed  above.        This report was finalized on 7/20/2024 10:46 AM by Yannick Quinn M.D..     Independent review of radiographic imaging: Available for my interpretation is a cervical MRI dated October 20, 2020: There is left C3/C4 neuroforaminal stenosis; there is left C5/C6 neuroforaminal stenosis; there is mild bilateral C6/C7 foraminal stenosis; canal is  patent.    Cervical MRI dated 7/20/2024 demonstrates: DDD; left C3/C4 NFS; mild bilateral C5/C6 NFS; left C6/C7 disc protrusion resulting in moderate left NFS, mild canal stenosis; multiple levels of facet arthropathy.     Lumbar MRI dated 7/20/2024 demonstrates: DDD worst at L4/5, L5/S1; broad-based disc bulge at L4/5 resulting in mild canal stenosis as well as mild bilateral NFS; moderate annular tear at L4/5; facet arthropathy worst at L4/5 and L5/S1        Impression & Plan:       06/26/2024: Amor Avila is a 45 y.o. male with past medical history significant for peripheral neuropathy who presents to the pain clinic for evaluation and treatment of chronic neck pain with radiation to bilateral upper extremities, chronic bilateral lower extremity heaviness and weakness with ambulation.  Upper extremity symptoms consistent with cervical radiculopathy, lower extremity symptoms consistent with lumbar spinal stenosis neurogenic claudication.  MRI interpretation of old cervical MRI as above.  Will obtain new cervical MRI and lumbar MRI to evaluate for cervical radiculopathy, lumbar spinal stenosis with neurogenic claudication.  I will also give him referral to physical therapy.  7/31/2024: Cervical and lumbar MRIs reviewed.  Examination most consistent with cervical radiculopathy, cervical spondylosis, lumbosacral spondylosis, lumbar spinal stenosis.  We discussed JEAN and LESI.  Can consider CMBB, LMBB, EMG/NCV.   11/12/2024: Good relief from LESI.  Can consider repeat if needed.  Good but transient relief from JEAN.  Will proceed with bilateral C3/4/5 MBB/RFA.  Additionally, will obtain EMGs/NCV of BUE to investigate potential cervical radiculopathy versus carpal tunnel syndrome/cubital tunnel syndrome.  2/13/2025: Minimal relief from C MBB.  Will plan for bilateral cervical and thoracic trigger point injections.  Can consider repeat LESI.  Obtain EMG/NCV of BUE to investigate and numbness and tingling.  7/03/2025:  good but transient relief from trigger point steroid injections.  Will start PT with consideration of dry needling.  Will refer to neurosurgery for radicular complaints.  Return of back pain.  Will plan for repeat LESI.  Will attain EMG/NCV of BUE.    1. Neck pain    2. Cervical radiculopathy    3. Numbness and tingling of hand    4. Cervical spondylosis without myelopathy    5. Spinal stenosis of lumbar region with neurogenic claudication    6. Lumbosacral spondylosis without myelopathy    7. Spinal stenosis of lumbar region, unspecified whether neurogenic claudication present                PLAN:  1. Medication Recommendations: Recommend Voltaren topical, NSAIDs, Tylenol.  Can trial turmeric 500 mg twice daily if NSAID contraindicated.    2. Physical Therapy: New PT with consideration of dry needling repeat  -Will provide patient with TENS unit from [facility]    3. Psychological: defer    4. Complementary and alternative (CAM) Therapies:     5. Labs/Diagnostic studies: Obtain EMG/NCV of BUE    6. Imagin. Interventions: Schedule repeat lumbar interlaminar epidural steroid injection; previous success at L4/5  We discussed a steroid injection to improve pain.  If greater than 50% relief for at least 2 to 3 months can consider repeat as needed every 3 to 4 months.  Had a discussion with the patient regarding the risk of the procedure including bleeding, infection, damage to surrounding structures.  We discussed the potential adverse effects of corticosteroid injection including flushing of the face, lipodystrophy, skin discoloration, elevated blood glucose, increased blood pressure.  Risks of frequent steroid administration includes weight gain, hormonal changes, mood changes, osteoporosis.       8. Referrals: PT, NSA, EMG    9. Records: n/a    10. Lifestyle goals:    Follow-up 4 months    [facility]  Geeta Benitez PA-C          Quality Metrics:  Patient screened positive  for depression based on a PHQ-9 score of 12 on 7/3/2025. Follow-up recommendations include: PCP managing depression.